# Patient Record
Sex: FEMALE | Race: AMERICAN INDIAN OR ALASKA NATIVE | URBAN - METROPOLITAN AREA
[De-identification: names, ages, dates, MRNs, and addresses within clinical notes are randomized per-mention and may not be internally consistent; named-entity substitution may affect disease eponyms.]

---

## 2017-11-25 ENCOUNTER — OFFICE VISIT (OUTPATIENT)
Dept: URGENT CARE | Facility: URGENT CARE | Age: 82
End: 2017-11-25
Payer: MEDICARE

## 2017-11-25 VITALS
SYSTOLIC BLOOD PRESSURE: 173 MMHG | OXYGEN SATURATION: 96 % | HEART RATE: 64 BPM | TEMPERATURE: 99.5 F | WEIGHT: 180 LBS | DIASTOLIC BLOOD PRESSURE: 88 MMHG

## 2017-11-25 DIAGNOSIS — I10 ESSENTIAL HYPERTENSION: ICD-10-CM

## 2017-11-25 DIAGNOSIS — R60.0 LEG EDEMA, RIGHT: Primary | ICD-10-CM

## 2017-11-25 PROCEDURE — 99203 OFFICE O/P NEW LOW 30 MIN: CPT | Performed by: PHYSICIAN ASSISTANT

## 2017-11-25 RX ORDER — MULTIVIT WITH MINERALS/LUTEIN
1 TABLET ORAL DAILY
COMMUNITY
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
## 2017-11-25 NOTE — NURSING NOTE
Chief Complaint   Patient presents with     Urgent Care     Pt in clinic c/o sudden onset of right foot and leg swelling.     Leg Swelling     Foot Problems       Initial /88  Pulse 64  Temp 99.5  F (37.5  C) (Tympanic)  Wt 180 lb (81.6 kg)  SpO2 96% There is no height or weight on file to calculate BMI.  Medication Reconciliation: complete   Jordyn Ugalde/ MA

## 2017-11-25 NOTE — MR AVS SNAPSHOT
"              After Visit Summary   2017    Daisy Mehta    MRN: [id number]           Patient Information     Date Of Birth          1935        Visit Information        Provider Department      2017 3:25 PM Armen Ugalde PA-C [facility]        Today's Diagnoses     Leg edema, right    -  1    Essential hypertension           Follow-ups after your visit        Who to contact     If you have questions or need follow up information about today's clinic visit or your schedule please contact [facility] directly at [phone].  Normal or non-critical lab and imaging results will be communicated to you by [employer], letter or phone within 4 business days after the clinic has received the results. If you do not hear from us within 7 days, please contact the clinic through [employer] or phone. If you have a critical or abnormal lab result, we will notify you by phone as soon as possible.  Submit refill requests through [employer] or call your pharmacy and they will forward the refill request to us. Please allow 3 business days for your refill to be completed.          Additional Information About Your Visit        MyChart Information     [employer] lets you send messages to your doctor, view your test results, renew your prescriptions, schedule appointments and more. To sign up, go to [email or web address] . Click on \"Log in\" on the left side of the screen, which will take you to the Welcome page. Then click on \"Sign up Now\" on the right side of the page.     You will be asked to enter the access code listed below, as well as some personal information. Please follow the directions to create your username and password.     Your access code is: [email or web address]  Expires: 2018  4:27 PM     Your access code will  in 90 days. If you need help or a new code, please call your [facility] clinic or [phone].        Care EveryWhere ID     This is your Care " EveryWhere ID. This could be used by other organizations to access your [facility] medical records  [id number]        Your Vitals Were     Pulse Temperature Pulse Oximetry             64 99.5  F (37.5  C) (Tympanic) 96%          Blood Pressure from Last 3 Encounters:   11/25/17 173/88    Weight from Last 3 Encounters:   11/25/17 180 lb (81.6 kg)              Today, you had the following     No orders found for display       Primary Care Provider    Physician No Ref-Primary       NO REF-PRIMARY PHYSICIAN        Equal Access to Services     MIREYATRISTEN ROBERTO : Hadii aad ku hadasho Soomaali, waaxda luqadaha, qaybta kaalmada adeegyada, waxay idiin hayaan adeeg mindyjerrymadhav lajenn . So [facility] [phone].    ATENCIÓN: Si habla español, tiene a sutherland disposición servicios gratuitos de asistencia lingüística. [facility] [phone].    We comply with applicable federal civil rights laws and [place] laws. We do not discriminate on the basis of race, color, national origin, age, disability, sex, sexual orientation, or gender identity.            Thank you!     Thank you for choosing [facility]  for your care. Our goal is always to provide you with excellent care. Hearing back from our patients is one way we can continue to improve our services. Please take a few minutes to complete the written survey that you may receive in the mail after your visit with us. Thank you!             Your Updated Medication List - Protect others around you: Learn how to safely use, store and throw away your medicines at www.disposemymeds.org.          This list is accurate as of: 11/25/17  4:27 PM.  Always use your most recent med list.                   Brand Name Dispense Instructions for use Diagnosis    ATENOLOL PO           CENTRUM SILVER per tablet      Take 1 tablet by mouth daily        IBUPROFEN PO           LORAZEPAM PO           RISPERIDONE PO           SERTRALINE HCL PO      Take by mouth daily        ZOLPIDEM TARTRATE  PO

## 2017-11-27 ENCOUNTER — HOSPITAL ENCOUNTER (OUTPATIENT)
Facility: CLINIC | Age: 82
Setting detail: OBSERVATION
Discharge: HOME | End: 2017-11-28
Attending: EMERGENCY MEDICINE | Admitting: FAMILY MEDICINE
Payer: MEDICARE

## 2017-11-27 ENCOUNTER — APPOINTMENT (OUTPATIENT)
Dept: ULTRASOUND IMAGING | Facility: CLINIC | Age: 82
End: 2017-11-27
Attending: EMERGENCY MEDICINE
Payer: MEDICARE

## 2017-11-27 DIAGNOSIS — I10 ESSENTIAL HYPERTENSION, MALIGNANT: ICD-10-CM

## 2017-11-27 DIAGNOSIS — I82.401 ACUTE DEEP VEIN THROMBOSIS (DVT) OF RIGHT LOWER EXTREMITY, UNSPECIFIED VEIN (H): ICD-10-CM

## 2017-11-27 DIAGNOSIS — I82.411 THROMBOSIS OF RIGHT FEMORAL VEIN (H): ICD-10-CM

## 2017-11-27 DIAGNOSIS — I10 BENIGN ESSENTIAL HYPERTENSION: Primary | ICD-10-CM

## 2017-11-27 LAB
ANION GAP SERPL CALCULATED.3IONS-SCNC: 8 MMOL/L (ref 3–14)
APTT PPP: 25 SEC (ref 22–37)
BASOPHILS # BLD AUTO: 0 10E9/L (ref 0–0.2)
BASOPHILS NFR BLD AUTO: 0.6 %
BUN SERPL-MCNC: 15 MG/DL (ref 7–30)
CALCIUM SERPL-MCNC: 9.1 MG/DL (ref 8.5–10.1)
CHLORIDE SERPL-SCNC: 104 MMOL/L (ref 94–109)
CO2 SERPL-SCNC: 26 MMOL/L (ref 20–32)
CREAT SERPL-MCNC: 0.85 MG/DL (ref 0.52–1.04)
D DIMER PPP FEU-MCNC: 9.5 UG/ML FEU (ref 0–0.5)
DIFFERENTIAL METHOD BLD: NORMAL
EOSINOPHIL # BLD AUTO: 0.2 10E9/L (ref 0–0.7)
EOSINOPHIL NFR BLD AUTO: 3 %
ERYTHROCYTE [DISTWIDTH] IN BLOOD BY AUTOMATED COUNT: 13.8 % (ref 10–15)
GFR SERPL CREATININE-BSD FRML MDRD: 64 ML/MIN/1.7M2
GLUCOSE SERPL-MCNC: 93 MG/DL (ref 70–99)
HCT VFR BLD AUTO: 42.5 % (ref 35–47)
HGB BLD-MCNC: 13.5 G/DL (ref 11.7–15.7)
IMM GRANULOCYTES # BLD: 0 10E9/L (ref 0–0.4)
IMM GRANULOCYTES NFR BLD: 0.3 %
INR PPP: 0.98 (ref 0.86–1.14)
LYMPHOCYTES # BLD AUTO: 2.8 10E9/L (ref 0.8–5.3)
LYMPHOCYTES NFR BLD AUTO: 39.6 %
MCH RBC QN AUTO: 30.1 PG (ref 26.5–33)
MCHC RBC AUTO-ENTMCNC: 31.8 G/DL (ref 31.5–36.5)
MCV RBC AUTO: 95 FL (ref 78–100)
MONOCYTES # BLD AUTO: 0.7 10E9/L (ref 0–1.3)
MONOCYTES NFR BLD AUTO: 10.4 %
NEUTROPHILS # BLD AUTO: 3.2 10E9/L (ref 1.6–8.3)
NEUTROPHILS NFR BLD AUTO: 46.1 %
NRBC # BLD AUTO: 0 10*3/UL
NRBC BLD AUTO-RTO: 0 /100
PLATELET # BLD AUTO: 262 10E9/L (ref 150–450)
POTASSIUM SERPL-SCNC: 4.7 MMOL/L (ref 3.4–5.3)
RBC # BLD AUTO: 4.49 10E12/L (ref 3.8–5.2)
SODIUM SERPL-SCNC: 138 MMOL/L (ref 133–144)
WBC # BLD AUTO: 7 10E9/L (ref 4–11)

## 2017-11-27 PROCEDURE — 85730 THROMBOPLASTIN TIME PARTIAL: CPT | Performed by: EMERGENCY MEDICINE

## 2017-11-27 PROCEDURE — 85379 FIBRIN DEGRADATION QUANT: CPT | Performed by: EMERGENCY MEDICINE

## 2017-11-27 PROCEDURE — 93971 EXTREMITY STUDY: CPT | Mod: RT

## 2017-11-27 PROCEDURE — 85025 COMPLETE CBC W/AUTO DIFF WBC: CPT | Performed by: EMERGENCY MEDICINE

## 2017-11-27 PROCEDURE — 84550 ASSAY OF BLOOD/URIC ACID: CPT | Performed by: EMERGENCY MEDICINE

## 2017-11-27 PROCEDURE — 99285 EMERGENCY DEPT VISIT HI MDM: CPT | Mod: Z6 | Performed by: EMERGENCY MEDICINE

## 2017-11-27 PROCEDURE — 99285 EMERGENCY DEPT VISIT HI MDM: CPT | Mod: 25 | Performed by: EMERGENCY MEDICINE

## 2017-11-27 PROCEDURE — 85610 PROTHROMBIN TIME: CPT | Performed by: EMERGENCY MEDICINE

## 2017-11-27 PROCEDURE — 80076 HEPATIC FUNCTION PANEL: CPT | Performed by: EMERGENCY MEDICINE

## 2017-11-27 PROCEDURE — 80048 BASIC METABOLIC PNL TOTAL CA: CPT | Performed by: EMERGENCY MEDICINE

## 2017-11-27 PROCEDURE — 84443 ASSAY THYROID STIM HORMONE: CPT | Performed by: EMERGENCY MEDICINE

## 2017-11-27 RX ORDER — ACETAMINOPHEN 500 MG
500 TABLET ORAL ONCE
Status: DISCONTINUED | OUTPATIENT
Start: 2017-11-27 | End: 2017-11-28 | Stop reason: HOSPADM

## 2017-11-27 ASSESSMENT — ENCOUNTER SYMPTOMS
SHORTNESS OF BREATH: 0
NUMBNESS: 0

## 2017-11-27 NOTE — IP AVS SNAPSHOT
[address] Observ[address] [address]    Phone:  [phone]    Fax:  [phone]                                       After Visit Summary   11/27/2017    Daisy Mehta    MRN: [id number]           After Visit Summary Signature Page     I have received my discharge instructions, and my questions have been answered. I have discussed any challenges I see with this plan with the nurse or doctor.    ..........................................................................................................................................  Patient/Patient Representative Signature      ..........................................................................................................................................  Patient Representative Print Name and Relationship to Patient    ..................................................               ................................................  Date                                            Time    ..........................................................................................................................................  Reviewed by Signature/Title    ...................................................              ..............................................  Date                                                            Time

## 2017-11-27 NOTE — IP AVS SNAPSHOT
MRN:[id number]                      After Visit Summary   11/27/2017    Daisy Mehta    MRN: [id number]           Thank you!     Thank you for choosing [facility] for your care. Our goal is always to provide you with excellent care. Hearing back from our patients is one way we can continue to improve our services. Please take a few minutes to complete the written survey that you may receive in the mail after you visit with us. Thank you!        Patient Information     Date Of Birth          1935        About your hospital stay     You were admitted on:  November 28, 2017 You last received care in the:  Unit [facility] Observation [facility]    You were discharged on:  November 28, 2017        Reason for your hospital stay       You were hospitalized due to new findings of a right leg blood clot. We have started a new medication called Xarelto (anticoagulant) to help treat this. You will need to take this medication for at least 3 months. You will need close follow up with primary care to recheck blood pressure and recheck symptoms.   Please DO NOT take Ibuprofen or Naproxen (NSAIDs) while taking Xarelto.   Please take Xarelto with food.                  Who to Call     For medical emergencies, please call 911.  For non-urgent questions about your medical care, please call your primary care provider or clinic, None          Attending Provider     Provider Specialty    Kalli Sheridan MD Emergency Medicine    [facility]       Primary Care Provider Fax #    Physician No Ref-Primary [phone]       When to contact your care team       Call your primary doctor if you have any of the following: temperature greater than 100.4F,  increased shortness of breath, increased swelling, vision changes or headache.                  After Care Instructions     Activity       Your activity upon discharge: activity as tolerated            Diet       Follow this diet upon discharge: Regular  "diet                  Follow-up Appointments     Adult [facility] Follow-up and recommended labs and tests       Follow up with primary care as scheduled on December 7th. If social work or care coordination is able to assist with resources relating to medication coverage, this would be very helpful.     Appointments on [facility] and/or [facility] (with [facility] or [facility] provider or service). Call [phone] if you haven't heard regarding these appointments within 7 days of discharge.                  Your next 10 appointments already scheduled     Dec 07, 2017  3:10 PM CST   (Arrive by 2:55 PM)   New Patient Visit with Carlito Romeo MD   [facility] ([facility])    [address]  4th Floor  [place] [address] [phone]              Pending Results     No orders found for last 3 day(s).            Statement of Approval     Ordered          11/28/17 5137  I have reviewed and agree with all the recommendations and orders detailed in this document.  EFFECTIVE NOW     Approved and electronically signed by:  Eleanor Vaz PA-C             Admission Information     Date & Time Provider Department Dept. Phone    11/27/2017 Brain Martini MD Unit 6D Observation [facility] [place] [phone]      Your Vitals Were     Blood Pressure Pulse Temperature Respirations Height Weight    161/82 (BP Location: Left arm) 71 97.5  F (36.4  C) (Oral) 17 1.651 m (5' 5\") 83.3 kg (183 lb 10.3 oz)    Pulse Oximetry BMI (Body Mass Index)                97% 30.56 kg/m2          [employer] Information     [employer] lets you send messages to your doctor, view your test results, renew your prescriptions, schedule appointments and more. To sign up, go to [email or web address] . Click on \"Log in\" on the left side of the screen, which will take you to the Welcome page. Then click on \"Sign up Now\" on the right side of the page.     You will be asked to enter the access code " listed below, as well as some personal information. Please follow the directions to create your username and password.     Your access code is: [email or web address]  Expires: 2018  4:27 PM     Your access code will  in 90 days. If you need help or a new code, please call your [facility] clinic or [phone].        Care EveryWhere ID     This is your Care EveryWhere ID. This could be used by other organizations to access your [facility] medical records  [id number]        Equal Access to Services     TRISTEN [facility] Hospital[facility] : Hadii joseph hicks hadtalyao Soyolie, waaxda luqadaha, qaybta kaalmada adejermaineyadonta, phyllis phillips . So [facility] [phone].    ATENCIÓN: Si habla español, tiene a sutherland disposición servicios gratuitos de asistencia lingüística. Llame al [phone].    We comply with applicable federal civil rights laws and [place] laws. We do not discriminate on the basis of race, color, national origin, age, disability, sex, sexual orientation, or gender identity.               Review of your medicines      START taking        Dose / Directions    Rivaroxaban 15 & 20 MG Tbpk   Used for:  Acute deep vein thrombosis (DVT) of right lower extremity, unspecified vein (H)        Dose:  15-20 mg   Take 15-20 mg by mouth daily   Quantity:  51 each   Refills:  2         CONTINUE these medicines which may have CHANGED, or have new prescriptions. If we are uncertain of the size of tablets/capsules you have at home, strength may be listed as something that might have changed.        Dose / Directions    atenolol 25 MG tablet   Commonly known as:  TENORMIN   This may have changed:  when to take this   Used for:  Benign essential hypertension        Dose:  50 mg   Take 2 tablets (50 mg) by mouth 2 times daily   Quantity:  60 tablet   Refills:  0         CONTINUE these medicines which have NOT CHANGED        Dose / Directions    CENTRUM SILVER per tablet        Dose:  1 tablet   Take 1 tablet by mouth daily   Refills:   0       LORAZEPAM PO        Dose:  1 mg   Take 1 mg by mouth 2 times daily as needed   Refills:  0       RISPERIDONE PO        Dose:  1 mg   Take 1 mg by mouth At Bedtime   Refills:  0       SERTRALINE HCL PO        Dose:  50 mg   Take 50 mg by mouth daily   Refills:  0       ZOLPIDEM TARTRATE PO        Dose:  6.25 mg   Take 6.25 mg by mouth At Bedtime   Refills:  0         STOP taking     IBUPROFEN PO                Where to get your medicines      These medications were sent to [facility] [place], [place] - [address]  [address] [address]     Phone:  [phone]     Rivaroxaban 15 & 20 MG Tbpk         Some of these will need a paper prescription and others can be bought over the counter. Ask your nurse if you have questions.     Bring a paper prescription for each of these medications     atenolol 25 MG tablet                Protect others around you: Learn how to safely use, store and throw away your medicines at www.disposemymeds.org.             Medication List: This is a list of all your medications and when to take them. Check marks below indicate your daily home schedule. Keep this list as a reference.      Medications           Morning Afternoon Evening Bedtime As Needed    atenolol 25 MG tablet   Commonly known as:  TENORMIN   Take 2 tablets (50 mg) by mouth 2 times daily   Last time this was given:  50 mg on 11/28/2017  3:11 AM                                CENTRUM SILVER per tablet   Take 1 tablet by mouth daily                                LORAZEPAM PO   Take 1 mg by mouth 2 times daily as needed                                RISPERIDONE PO   Take 1 mg by mouth At Bedtime   Last time this was given:  1 mg on 11/28/2017  3:11 AM                                Rivaroxaban 15 & 20 MG Tbpk   Take 15-20 mg by mouth daily                                SERTRALINE HCL PO   Take 50 mg by mouth daily   Last time this was given:  50 mg on 11/28/2017  8:00 AM                                 ZOLPIDEM TARTRATE PO   Take 6.25 mg by mouth At Bedtime

## 2017-11-28 ENCOUNTER — APPOINTMENT (OUTPATIENT)
Dept: CARDIOLOGY | Facility: CLINIC | Age: 82
End: 2017-11-28
Payer: MEDICARE

## 2017-11-28 VITALS
OXYGEN SATURATION: 97 % | HEART RATE: 71 BPM | RESPIRATION RATE: 17 BRPM | BODY MASS INDEX: 30.6 KG/M2 | SYSTOLIC BLOOD PRESSURE: 161 MMHG | WEIGHT: 183.64 LBS | HEIGHT: 65 IN | TEMPERATURE: 97.5 F | DIASTOLIC BLOOD PRESSURE: 82 MMHG

## 2017-11-28 PROBLEM — I82.409 DVT (DEEP VENOUS THROMBOSIS) (H): Status: ACTIVE | Noted: 2017-11-28

## 2017-11-28 LAB
ALBUMIN SERPL-MCNC: 3.1 G/DL (ref 3.4–5)
ALBUMIN SERPL-MCNC: 3.2 G/DL (ref 3.4–5)
ALP SERPL-CCNC: 82 U/L (ref 40–150)
ALP SERPL-CCNC: 85 U/L (ref 40–150)
ALT SERPL W P-5'-P-CCNC: 21 U/L (ref 0–50)
ALT SERPL W P-5'-P-CCNC: 22 U/L (ref 0–50)
ANION GAP SERPL CALCULATED.3IONS-SCNC: 9 MMOL/L (ref 3–14)
AST SERPL W P-5'-P-CCNC: 17 U/L (ref 0–45)
AST SERPL W P-5'-P-CCNC: 32 U/L (ref 0–45)
BASOPHILS # BLD AUTO: 0 10E9/L (ref 0–0.2)
BASOPHILS NFR BLD AUTO: 0.6 %
BILIRUB DIRECT SERPL-MCNC: <0.1 MG/DL (ref 0–0.2)
BILIRUB SERPL-MCNC: 0.5 MG/DL (ref 0.2–1.3)
BILIRUB SERPL-MCNC: 0.7 MG/DL (ref 0.2–1.3)
BUN SERPL-MCNC: 11 MG/DL (ref 7–30)
CALCIUM SERPL-MCNC: 8.9 MG/DL (ref 8.5–10.1)
CHLORIDE SERPL-SCNC: 105 MMOL/L (ref 94–109)
CO2 SERPL-SCNC: 27 MMOL/L (ref 20–32)
CREAT SERPL-MCNC: 0.79 MG/DL (ref 0.52–1.04)
DIFFERENTIAL METHOD BLD: NORMAL
EOSINOPHIL # BLD AUTO: 0.2 10E9/L (ref 0–0.7)
EOSINOPHIL NFR BLD AUTO: 3.1 %
ERYTHROCYTE [DISTWIDTH] IN BLOOD BY AUTOMATED COUNT: 13.8 % (ref 10–15)
GFR SERPL CREATININE-BSD FRML MDRD: 69 ML/MIN/1.7M2
GLUCOSE SERPL-MCNC: 84 MG/DL (ref 70–99)
HCT VFR BLD AUTO: 44.5 % (ref 35–47)
HGB BLD-MCNC: 14.3 G/DL (ref 11.7–15.7)
IMM GRANULOCYTES # BLD: 0 10E9/L (ref 0–0.4)
IMM GRANULOCYTES NFR BLD: 0.3 %
INTERPRETATION ECG - MUSE: NORMAL
LYMPHOCYTES # BLD AUTO: 2.9 10E9/L (ref 0.8–5.3)
LYMPHOCYTES NFR BLD AUTO: 42.3 %
MAGNESIUM SERPL-MCNC: 2.5 MG/DL (ref 1.6–2.3)
MCH RBC QN AUTO: 30.2 PG (ref 26.5–33)
MCHC RBC AUTO-ENTMCNC: 32.1 G/DL (ref 31.5–36.5)
MCV RBC AUTO: 94 FL (ref 78–100)
MONOCYTES # BLD AUTO: 0.4 10E9/L (ref 0–1.3)
MONOCYTES NFR BLD AUTO: 6.2 %
NEUTROPHILS # BLD AUTO: 3.2 10E9/L (ref 1.6–8.3)
NEUTROPHILS NFR BLD AUTO: 47.5 %
NRBC # BLD AUTO: 0 10*3/UL
NRBC BLD AUTO-RTO: 0 /100
PLATELET # BLD AUTO: 260 10E9/L (ref 150–450)
POTASSIUM SERPL-SCNC: 3.9 MMOL/L (ref 3.4–5.3)
PROT SERPL-MCNC: 7.9 G/DL (ref 6.8–8.8)
PROT SERPL-MCNC: 7.9 G/DL (ref 6.8–8.8)
RADIOLOGIST FLAGS: ABNORMAL
RBC # BLD AUTO: 4.73 10E12/L (ref 3.8–5.2)
SODIUM SERPL-SCNC: 141 MMOL/L (ref 133–144)
TSH SERPL DL<=0.005 MIU/L-ACNC: 2.67 MU/L (ref 0.4–4)
URATE SERPL-MCNC: 4.3 MG/DL (ref 2.6–6)
WBC # BLD AUTO: 6.8 10E9/L (ref 4–11)

## 2017-11-28 PROCEDURE — 36415 COLL VENOUS BLD VENIPUNCTURE: CPT | Performed by: PHYSICIAN ASSISTANT

## 2017-11-28 PROCEDURE — 93010 ELECTROCARDIOGRAM REPORT: CPT | Performed by: INTERNAL MEDICINE

## 2017-11-28 PROCEDURE — 93005 ELECTROCARDIOGRAM TRACING: CPT

## 2017-11-28 PROCEDURE — G0378 HOSPITAL OBSERVATION PER HR: HCPCS

## 2017-11-28 PROCEDURE — 93306 TTE W/DOPPLER COMPLETE: CPT | Mod: 26 | Performed by: INTERNAL MEDICINE

## 2017-11-28 PROCEDURE — A9270 NON-COVERED ITEM OR SERVICE: HCPCS | Mod: GY | Performed by: PHYSICIAN ASSISTANT

## 2017-11-28 PROCEDURE — 25500064 ZZH RX 255 OP 636: Performed by: FAMILY MEDICINE

## 2017-11-28 PROCEDURE — 85025 COMPLETE CBC W/AUTO DIFF WBC: CPT | Performed by: PHYSICIAN ASSISTANT

## 2017-11-28 PROCEDURE — 99219 ZZC INITIAL OBSERVATION CARE,LEVL II: CPT | Mod: Z6 | Performed by: PHYSICIAN ASSISTANT

## 2017-11-28 PROCEDURE — 83735 ASSAY OF MAGNESIUM: CPT | Performed by: PHYSICIAN ASSISTANT

## 2017-11-28 PROCEDURE — 80053 COMPREHEN METABOLIC PANEL: CPT | Performed by: PHYSICIAN ASSISTANT

## 2017-11-28 PROCEDURE — 25000132 ZZH RX MED GY IP 250 OP 250 PS 637: Mod: GY | Performed by: PHYSICIAN ASSISTANT

## 2017-11-28 PROCEDURE — 40000264 ECHO COMPLETE WITH OPTISON

## 2017-11-28 RX ORDER — RISPERIDONE 1 MG/1
1 TABLET ORAL AT BEDTIME
Status: DISCONTINUED | OUTPATIENT
Start: 2017-11-28 | End: 2017-11-28 | Stop reason: HOSPADM

## 2017-11-28 RX ORDER — ATENOLOL 25 MG/1
50 TABLET ORAL 2 TIMES DAILY
Qty: 60 TABLET | Refills: 0 | Status: SHIPPED | OUTPATIENT
Start: 2017-11-28 | End: 2018-02-13

## 2017-11-28 RX ORDER — ZOLPIDEM TARTRATE 6.25 MG/1
6.25 TABLET, FILM COATED, EXTENDED RELEASE ORAL AT BEDTIME
Status: DISCONTINUED | OUTPATIENT
Start: 2017-11-28 | End: 2017-11-28 | Stop reason: HOSPADM

## 2017-11-28 RX ORDER — ATENOLOL 50 MG/1
50 TABLET ORAL ONCE
Status: DISCONTINUED | OUTPATIENT
Start: 2017-11-28 | End: 2017-11-28

## 2017-11-28 RX ORDER — ATENOLOL 25 MG/1
50 TABLET ORAL DAILY
Status: DISCONTINUED | OUTPATIENT
Start: 2017-11-28 | End: 2017-11-28

## 2017-11-28 RX ORDER — LORAZEPAM 1 MG/1
1 TABLET ORAL 2 TIMES DAILY PRN
Status: DISCONTINUED | OUTPATIENT
Start: 2017-11-28 | End: 2017-11-28 | Stop reason: HOSPADM

## 2017-11-28 RX ORDER — NALOXONE HYDROCHLORIDE 0.4 MG/ML
.1-.4 INJECTION, SOLUTION INTRAMUSCULAR; INTRAVENOUS; SUBCUTANEOUS
Status: DISCONTINUED | OUTPATIENT
Start: 2017-11-28 | End: 2017-11-28 | Stop reason: HOSPADM

## 2017-11-28 RX ORDER — LIDOCAINE 40 MG/G
CREAM TOPICAL
Status: DISCONTINUED | OUTPATIENT
Start: 2017-11-28 | End: 2017-11-28 | Stop reason: HOSPADM

## 2017-11-28 RX ORDER — ATENOLOL 25 MG/1
50 TABLET ORAL 2 TIMES DAILY
Status: DISCONTINUED | OUTPATIENT
Start: 2017-11-28 | End: 2017-11-28 | Stop reason: HOSPADM

## 2017-11-28 RX ADMIN — RIVAROXABAN 15 MG: 15 TABLET, FILM COATED ORAL at 03:11

## 2017-11-28 RX ADMIN — RIVAROXABAN 15 MG: 15 TABLET, FILM COATED ORAL at 12:48

## 2017-11-28 RX ADMIN — RISPERIDONE 1 MG: 1 TABLET ORAL at 03:11

## 2017-11-28 RX ADMIN — HUMAN ALBUMIN MICROSPHERES AND PERFLUTREN 6 ML: 10; .22 INJECTION, SOLUTION INTRAVENOUS at 09:30

## 2017-11-28 RX ADMIN — ATENOLOL 50 MG: 25 TABLET ORAL at 03:11

## 2017-11-28 RX ADMIN — ZOLPIDEM TARTRATE 6.25 MG: 6.25 TABLET, FILM COATED, EXTENDED RELEASE ORAL at 03:11

## 2017-11-28 RX ADMIN — SERTRALINE HYDROCHLORIDE 50 MG: 50 TABLET ORAL at 08:00

## 2017-11-28 ASSESSMENT — ENCOUNTER SYMPTOMS
VOMITING: 0
WEAKNESS: 0
BRUISES/BLEEDS EASILY: 0
PALPITATIONS: 0
BACK PAIN: 1
NECK PAIN: 0
FATIGUE: 0
COUGH: 0
LIGHT-HEADEDNESS: 0
ABDOMINAL PAIN: 0
COLOR CHANGE: 0
FEVER: 0
NAUSEA: 0
BLOOD IN STOOL: 0
NECK STIFFNESS: 0
DIZZINESS: 0

## 2017-11-28 NOTE — PROGRESS NOTES
"  Care Coordinator Progress Note     Admission Date/Time:  11/27/2017  Attending MD:  Brain Martini MD     Data  Chart reviewed, discussed with interdisciplinary team.   Patient was admitted for: Acute deep vein thrombosis (DVT) of right lower extremity, unspecified vein (H).    Concerns with insurance coverage for discharge needs: None.  Current Living Situation: Patient lives with adult child.  Support System: Supportive and Involved  Services Involved: none currently.   Transportation: Family or Friend will provide  Barriers to Discharge: chronically ill and cognitively impaired    Coordination of Care and Referrals: Provided patient/family with options for discussion of current living situation.       Assessment   At pt bedside to discuss RNCC role, establishing primary care, homecare and pt current living situation. Pt states she is currently staying with her son (pt from [place]). Writer asked pt permission to schedule PCP appointment to establish primary care; pt states, \"No. I don't think I can afford it. I have to discuss this with my son first.\" Pt also does *not* want recommended home care services as she states she wants to discuss with son first. GILDA Forrester attempted to call son and left VM. Pt states he will most likely not call back as he is at work.      Plan  Anticipated Discharge Date: pending  Anticipated Discharge Plan:  pending    Concetta Hope RN          "
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
## 2017-11-28 NOTE — ED PROVIDER NOTES
"  History     Chief Complaint   Patient presents with     Leg Pain     HPI  Daisy Mehta is a 82 year old female with a history of arthritis and hypertension who presents to the ED with right leg pain swelling.     Patient notes 2-3 day history of right lower leg and foot swelling up to the right knee. Patient notes \"funny sensation\" all over right lower leg and states it is \"stiff\" with the swelling which is uncomfortable for her, no severe pain. Denies previous history of gout, pseudo-gout, joint infections, PE/DVT. No jordy joint pain. No traumas or falls. No prolonged travel. No estrogen products. Denies chest pain, shortness of breath. No syncope or near syncope. No LH or dizziness. Denies numbness and tingling of left lower extremity. No weakness. No other new symptoms or complaints at this time.    Patient was seen at urgent care this past Saturday for swelling and patient notes clinic was unable to perform diagnostic testing due to the fact that they did not have an ultrasound. Patient states she is blood pressure medications of: sertraline, atenolol, risperidone, Zolpidem and takes Ibprofen, lorazepam prn.  I have reviewed the Medications, Allergies, Past Medical and Surgical History, and Social History in the Epic system.    History reviewed. No pertinent past medical history.    History reviewed. No pertinent surgical history.    No family history on file.    Social History   Substance Use Topics     Smoking status: Never Smoker     Smokeless tobacco: Current User     Alcohol use No       No current facility-administered medications for this encounter.      Current Outpatient Prescriptions   Medication     Multiple Vitamins-Minerals (CENTRUM SILVER) per tablet     LORAZEPAM PO     RISPERIDONE PO     ATENOLOL PO     SERTRALINE HCL PO     IBUPROFEN PO     ZOLPIDEM TARTRATE PO        No Known Allergies    Review of Systems   Constitutional: Negative for fatigue and fever.   Respiratory: Negative for cough " and shortness of breath.    Cardiovascular: Positive for leg swelling (right ). Negative for chest pain and palpitations.   Gastrointestinal: Negative for abdominal pain, blood in stool, nausea and vomiting.   Musculoskeletal: Positive for back pain (chronic, unchanged). Negative for neck pain and neck stiffness.        RLE swelling, mild discomfort Knee down through foot. Remainder unaffected   Skin: Negative for color change and rash.   Allergic/Immunologic: Negative for immunocompromised state.   Neurological: Negative for dizziness, syncope, weakness, light-headedness and numbness.   Hematological: Does not bruise/bleed easily.        No known clotting disorders   All other systems reviewed and are negative.      Physical Exam   BP: 198/87  Pulse: 71  Heart Rate: 73  Temp: 98.4  F (36.9  C)  Resp: 18  SpO2: 98 %      Physical Exam   CONSTITUTIONAL: Well-developed and well-nourished. Awake and alert. Non-toxic appearance. No acute distress.   HENT:   - Head: Normocephalic and atraumatic.   - Ears: Hearing and external ear grossly normal.   - Nose: Nose normal. No rhinorrhea. No epistaxis.   - Mouth/Throat: Oropharynx is clear and MMM  EYES: Conjunctivae and lids are normal. No scleral icterus.   NECK: Normal range of motion and phonation normal. Neck supple.  No tracheal deviation, no stridor. No edema or erythema noted.  CARDIOVASCULAR: Normal rate, regular rhythm and no appreciable abnormal heart sounds.  PULMONARY/CHEST: Effort normal. No accessory muscle usage or stridor. No respiratory distress.  No appreciable abnormal breath sounds.  ABDOMEN: Soft, non-distended. No tenderness. No rigidity, rebound or guarding.   MUSCULOSKELETAL: Extremities warm and seemingly well perfused. RLE edema from about the knee down. No erythema or skin changes. No appreciable join effusions, no abnormal warmth or erythema. Normal joint ROM. Strength intact.    NEUROLOGIC: Awake, alert. Not disoriented. Normal tone. No seizure  activity. Coordination normal. GCS 15  SKIN: Skin is warm and dry. No rash noted. No diaphoresis. No pallor.   PSYCHIATRIC: Normal mood and affect. Speech and behavior normal. Thought processes linear. Cognition and memory are normal.     ED Course     ED Course     Procedures             Labs Ordered and Resulted from Time of ED Arrival Up to the Time of Departure from the ED   D DIMER QUANTITATIVE   CBC WITH PLATELETS DIFFERENTIAL   BASIC METABOLIC PANEL            Assessments & Plan (with Medical Decision Making)   IMPRESSION: 82 year old female with notable for HTN, some early dementia, who presents with a 3 day history of right lower extremity swelling and discomfort. Clinically the patient looks well, she has hypertension with quite high systolics, not as high diastolics, but is completely asymptomatic in that regard. No chest pain, headaches, no renal changes, or no shortness of breath, etc.  With regards to her right lower extremity, she has some swelling essentially from about the knee down. No evidence for cellulitis. No particular warmth over any of the joints and still has full range of motion of the joints without significant discomfort.  No apparent joint effusions or erythema there for a septic or inflammatory arthritis.  It does appear consistent with possible DVT though no significant risk factors for such are known at this time, no recent traumas, etc. No known hx of heart, lung or renal disease for other causes for peripheral edema, and even then think would be unlikely to present unilaterally.    PLAN: Baseline laboratory studies, DVT ultrasound of the right leg.    RESULTS:  - Labs: D-dimer 9.5, otherwise CBC, BMP, hepatic panel, uric acid, thyroid, coags are grossly WNL  --- See ED Course section above for particular pertinent findings and comments  - Imaging: Images and written preliminary reports reviewed by myself and revealed:  --- Right leg ultrasound: Called by Radiology, reportedly has a  nonocclusive popliteal and femoral vein thrombus    RE-EVALUATION:  - No acute changes in ED. Was hypertensive, though asymptomatic.   - Ordered her AM atenolol early given her BP    DISCUSSIONS:  - w/ ED Obs/SHEKHAR: They will admit for further evaluation and management  - w/ Patient: I have reviewed the available findings, plan with the patient and her family/loved ones. They expressed understanding and agreement with this plan. All questions answered to the best of our ability at this time.     DISPOSITION/PLANNING:  - FINAL IMPRESSION: Right lower extremity DVT, HTN  - DISPOSITION: Admitted to ED observation for further evaluation and management, initiation of anticoagulation and discussion with family about best plan/modality for such.  --- Asymptomatic but higher BP, PCP may need to adjust/augment chronic BP management regimen. Received home dose of atenolol here in ED.    This part of the medical record was transcribed by Nicholas Perdomo Medical Scribe, from a dictation done by Kalli Sheridan MD.    _____________________________________________________________________________    - I have reviewed the available nursing notes.            New Prescriptions    No medications on file       Final diagnoses:   None   IYuli, am serving as a trained medical scribe to document services personally performed by Kalli Sheridan MD, based on the provider's statements to me.    IKalli MD, was physically present and have reviewed and verified the accuracy of this note documented by Yuli Cherry.     11/27/2017   [facility] EMERGENCY DEPARTMENT     Kalli Sheridan MD  11/28/17 [id number]

## 2017-11-28 NOTE — PLAN OF CARE
Problem: Patient Care Overview  Goal: Discharge Needs Assessment  - Anticoagulants initiated. Yes  - Patient or patient's representative demonstrates ability to administer Low Molecular Weight Heparin (LMWH) or home health is arranged. No  - Patient education re: Deep Vein Thrombosis, LMWH, Coumadin. No  - Diagnostic tests and consults completed and resulted. No. Pending  - Vital signs normal or at patient baseline. No. Elevated b/p  - Adequate pain control on oral analgesia if ordered. Yes. Patient denies pain  - Return to baseline functional status. Yes  - Safe disposition plan has been identified. No. Pending  Patient alert. Forgetful. Presented with leg pain and swelling. Has DVT per results. Swelling noted left LE. Denies pain. Echo cardiogram to be done in am. B/P elevated.

## 2017-11-28 NOTE — PLAN OF CARE
Problem: Patient Care Overview  Goal: Discharge Needs Assessment  Anticoagulants initiated. Yes  - Patient or patient's representative demonstrates ability to administer Low Molecular Weight Heparin (LMWH) or home health is arranged. No  - Patient education re: Deep Vein Thrombosis, LMWH, Coumadin. No  - Diagnostic tests and consults completed and resulted. No. Pending  - Vital signs normal or at patient baseline. No. Elevated b/p  - Adequate pain control on oral analgesia if ordered. Yes. Patient denies pain  - Return to baseline functional status. Yes  - Safe disposition plan has been identified. No. Pending  Patient alert. Forgetful. Presented with leg pain and swelling. Has DVT per results. Swelling noted left LE. Denies pain. Echo cardiogram to be done in am. B/P elevated.

## 2017-11-28 NOTE — PLAN OF CARE
Problem: Patient Care Overview  Goal: Plan of Care/Patient Progress Review  Outcome: No Change  Outpatient/Observation goals to be met before discharge home:    OBSERVATION UNIT DISCHARGE PLANNING GOALS:    Anticoagulants initiated. Yes.  - Patient or patient's representative demonstrates ability to administer Low Molecular Weight Heparin (LMWH) or home health is arranged. No.  - Patient education re: Deep Vein Thrombosis, LMWH, Coumadin. No, pt is confuse, oriented to self only. Pt's son is involve with pt care. Will provide son with information, when he comes.  - Diagnostic tests and consults completed and resulted. Yes.  - Vital signs normal or at patient baseline. No, pt has elevated bp prior to admission per provider.  - Adequate pain control on oral analgesia if ordered. Yes, she robert pain.  - Return to baseline functional status. Yes  - Safe disposition plan has been identified. Yes.  Patient alert. Forgetful. Presented with leg pain and swelling. Has DVT per results. Swelling noted left LE. Denies pain. Echo cardiogram to be done in am. Yes  The provider is coordinating discharge plan with pt's son.

## 2017-11-28 NOTE — ED NOTES
Pt presents ambulatory to triage from home. Pt states for past 2-3 days has had pain at right leg and foot along with swelling. Pt was seen UC this past Saturday for swelling. Opted not to have ultrasound at  that time.

## 2017-11-28 NOTE — H&P
[facility] ED Observation Admission Note    Chief Complaint   Patient presents with     Leg Pain       Assessment: Daisy Mehta is a 82 year old obese female with a history of HTN, arthritis, dementia who presented to the ED with right leg pain and swelling. She reports a 3 day history of RLE swelling and pain.      Plan:  1. New Onset DVT: 3 day history of RLE swelling and pain. 2 mechanical falls in 3 months but no trauma/injury. Travelled from [place] 3 months ago to [place]. Denies h/o PE/DVT, gastric bypass, GI issues, cancer, miscarriages, tobacco use, recent hormonal use, miscarriages, family history of bleeding disorders. Denies any chest pain, SOB, palpitations or tachycardia, headache, lightheadedness. She is pretty sedentary. In ED, HR 70's, 's-230's/80's-110's, RR 16-18, SaO2 % on RA, Temp 98.4. Normal CMP except an albumin of 3.1. Normal CBC. INR 0.98. D-dimer 9.5. RLE doppler US reports a nonocclusive DVT in the right femoral and popliteal.   - Initiate Xarelto 15mg po BID x 21 days and then 20mg po daily thereafter  - Baseline EKG in AM  - Repeat CBC, BMP in AM    2. HTN: HR 70's, 's-230's/80's-110's. Per son has been running as high as 180's prior to this. Only on Atenolol which was started in the [place]. She has not followed with anyone here regarding this.   - Continue Atenolol 50mg po daily. Give dose now  - Consider adding on a second agent    Chronic Problems:  ## Dementia/FABRICIO: - Continue PTA Risperidone, Sertraline, Trazodone    HPI:    Daisy Mehta is a 82 year old obese female with a history of HTN, arthritis, dementia who presented to the ED with right leg pain and swelling. She reports a 3 day history of RLE swelling and pain. Onset was gradual and has progressed to swelling up to the knee. The patient denies any trauma however her son reports that she has had 2 falls in the last 3 months due to being tangled up in her sheets at night and confusion and having to go to  the bathroom. There was no injury. Her son brought her here from the [place] 3 months ago and since then there has been no travel. She denies any history of PE or DVT. No history gastric bypass, GI issues, cancer, miscarriages, tobacco use, recent hormonal use, miscarriages, family history of bleeding disorders. Her son reports that she is pretty sedentary otherwise. Denies any chest pain, SOB, palpitations or tachycardia, headache, lightheadedness. Off note she was seen at a [facility] 2 days ago and was told to come to the [facility] ED as they didn't have an ultrasound however they didn't. Her son states that her BP has been elevated for sometime and she is compliant with her medicines specifically Atenolol.    In the ED, HR 70's, 's-230's/80's-110's, RR 16-18, SaO2 % on RA, Temp 98.4. Labs show normal CMP except an albumin of 3.1. Normal CBC. INR 0.98. D-dimer 9.5. RLE doppler US reports a nonocclusive DVT in the right femoral and popliteal. She is being admitted for management.     On admission to the observation unit the patient was stable.    History:    Past Medical History:   Diagnosis Date     Dementia      Hypertension        History reviewed. No pertinent surgical history.    No family history on file.    Social History     Social History     Marital status:      Spouse name: N/A     Number of children: N/A     Years of education: N/A     Occupational History     Not on file.     Social History Main Topics     Smoking status: Never Smoker     Smokeless tobacco: Current User     Alcohol use No     Drug use: No     Sexual activity: Not on file     Other Topics Concern     Not on file     Social History Narrative         No current facility-administered medications on file prior to encounter.   Current Outpatient Prescriptions on File Prior to Encounter:  Multiple Vitamins-Minerals (CENTRUM SILVER) per tablet Take 1 tablet by mouth daily   LORAZEPAM PO Take 1 mg by mouth 2 times daily  as needed    RISPERIDONE PO Take 1 mg by mouth At Bedtime    ATENOLOL PO Take 50 mg by mouth daily    SERTRALINE HCL PO Take 50 mg by mouth daily    IBUPROFEN PO    ZOLPIDEM TARTRATE PO Take 6.25 mg by mouth At Bedtime        Data:    Results for orders placed or performed during the hospital encounter of 11/27/17   US Lower Extremity Venous Duplex Right   Result Value Ref Range    Radiologist flags Newly identified deep venous thrombosis (Urgent)     Narrative    EXAMINATION: US LOWER EXTREMITY VENOUS DUPLEX RIGHT, 11/27/2017 11:24  PM     COMPARISON: None.    HISTORY: Right lower summary swelling    TECHNIQUE:  Gray-scale evaluation with compression, spectral flow, and  color Doppler assessment of the deep venous system of the right leg  from groin to knee, and then at the ankle.    FINDINGS:  In the right lower extremity, there is partial compressibility of the  femoral vein through the length of the thigh and the popliteal vein.  The common femoral vein is fully compressible, as is the posterior  tibial vein at the ankle. There is small amount of subcutaneous edema  at the ankle.     The left common femoral vein is patent and compressible.        Impression    IMPRESSION:  Nonocclusive deep venous thrombosis in the right femoral and popliteal  veins.      [Urgent Result: Newly identified deep venous thrombosis]    Finding was identified on 11/27/2017 11:25 PM.     Dr. Sheridan was contacted by Dr. Ballesteros at 11/27/2017 11:28 PM and  verbalized understanding of the urgent finding.      D dimer quantitative   Result Value Ref Range    D Dimer 9.5 (H) 0.0 - 0.50 ug/ml FEU   CBC with platelets differential   Result Value Ref Range    WBC 7.0 4.0 - 11.0 10e9/L    RBC Count 4.49 3.8 - 5.2 10e12/L    Hemoglobin 13.5 11.7 - 15.7 g/dL    Hematocrit 42.5 35.0 - 47.0 %    MCV 95 78 - 100 fl    MCH 30.1 26.5 - 33.0 pg    MCHC 31.8 31.5 - 36.5 g/dL    RDW 13.8 10.0 - 15.0 %    Platelet Count 262 150 - 450 10e9/L    Diff Method  Automated Method     % Neutrophils 46.1 %    % Lymphocytes 39.6 %    % Monocytes 10.4 %    % Eosinophils 3.0 %    % Basophils 0.6 %    % Immature Granulocytes 0.3 %    Nucleated RBCs 0 0 /100    Absolute Neutrophil 3.2 1.6 - 8.3 10e9/L    Absolute Lymphocytes 2.8 0.8 - 5.3 10e9/L    Absolute Monocytes 0.7 0.0 - 1.3 10e9/L    Absolute Eosinophils 0.2 0.0 - 0.7 10e9/L    Absolute Basophils 0.0 0.0 - 0.2 10e9/L    Abs Immature Granulocytes 0.0 0 - 0.4 10e9/L    Absolute Nucleated RBC 0.0    Basic metabolic panel   Result Value Ref Range    Sodium 138 133 - 144 mmol/L    Potassium 4.7 3.4 - 5.3 mmol/L    Chloride 104 94 - 109 mmol/L    Carbon Dioxide 26 20 - 32 mmol/L    Anion Gap 8 3 - 14 mmol/L    Glucose 93 70 - 99 mg/dL    Urea Nitrogen 15 7 - 30 mg/dL    Creatinine 0.85 0.52 - 1.04 mg/dL    GFR Estimate 64 >60 mL/min/1.7m2    GFR Estimate If Black 77 >60 mL/min/1.7m2    Calcium 9.1 8.5 - 10.1 mg/dL   INR   Result Value Ref Range    INR 0.98 0.86 - 1.14   Partial thromboplastin time   Result Value Ref Range    PTT 25 22 - 37 sec   Hepatic panel   Result Value Ref Range    Bilirubin Direct <0.1 0.0 - 0.2 mg/dL    Bilirubin Total 0.5 0.2 - 1.3 mg/dL    Albumin 3.1 (L) 3.4 - 5.0 g/dL    Protein Total 7.9 6.8 - 8.8 g/dL    Alkaline Phosphatase 82 40 - 150 U/L    ALT 22 0 - 50 U/L    AST 32 0 - 45 U/L   TSH with free T4 reflex   Result Value Ref Range    TSH 2.67 0.40 - 4.00 mU/L   Uric acid   Result Value Ref Range    Uric Acid 4.3 2.6 - 6.0 mg/dL          ROS:    Review Of Systems  Skin: negative  Eyes: negative  Ears/Nose/Throat: negative  Respiratory: No shortness of breath, dyspnea on exertion, cough, or hemoptysis  Cardiovascular: negative  Gastrointestinal: negative for, poor appetite, nausea, vomiting, heartburn, hematemesis, abdominal pain, melena, hematochezia, constipation and stomach or duodenal ulcer  Genitourinary: negative  Musculoskeletal: back pain, arthritis and joint pain  Neurologic:  dementia  Psychiatric: negative  Hematologic/Lymphatic/Immunologic: negative  Endocrine: negative  PCP: none    10 point ROS negative other than the symptoms noted above.      Exam:  Vitals:  B/P: 200/88, T: 98.4, P: 71, R: 16    Constitutional: alert, no distress, cooperative and over weight  Head: Normocephalic. No masses, lesions, tenderness or abnormalities  Neck: Neck supple. No adenopathy. Thyroid symmetric, normal size,, Carotids without bruits.  ENT: ENT exam normal, no neck nodes or sinus tenderness  Cardiovascular: negative, PMI normal. No lifts, heaves, or thrills. RRR. No murmurs, clicks gallops or rub  Respiratory: negative, Percussion normal. Good diaphragmatic excursion. Lungs clear  Gastrointestinal: Abdomen soft, non-tender. BS normal. No masses, organomegaly  : Deferred  Musculoskeletal: extremities normal- no gross deformities noted, gait normal and normal muscle tone  Ext: RLE edema with 2+ pitting  Skin: no suspicious lesions or rashes  Neurologic: Gait normal. Reflexes normal and symmetric. Sensation grossly WNL.  Psychiatric: mentation appears normal and affect normal/bright  Hematologic/Lymphatic/Immunologic: normal ant/post cervical, axillary, supraclavicular and inguinal nodes    Consults: none  FEN: regular diet  DVT prophylaxis: xarelto  GI prophylaxis: none  BM prophylaxis: none  Code Status: full code  Disposition: home if labs and vitals are all stable, tolerating xarelto    Signed:  Genaro Li PA-C   November 28, 2017 at 2:17 AM

## 2017-11-28 NOTE — PLAN OF CARE
Problem: Patient Care Overview  Goal: Plan of Care/Patient Progress Review  Outcome: No Change  Outpatient/Observation goals to be met before discharge home:  OBSERVATION UNIT DISCHARGE PLANNING GOALS:    Anticoagulants initiated. Yes.  - Patient or patient's representative demonstrates ability to administer Low Molecular Weight Heparin (LMWH) or home health is arranged. No.  - Patient education re: Deep Vein Thrombosis, LMWH, Coumadin. No, pt is confuse.  - Diagnostic tests and consults completed and resulted. No. Pending.  - Vital signs normal or at patient baseline. No, pt has elevated bp prior to admission per provider.  - Adequate pain control on oral analgesia if ordered. Yes.   - Return to baseline functional status. Yes  - Safe disposition plan has been identified. Yes.  Patient alert. Forgetful. Presented with leg pain and swelling. Has DVT per results. Swelling noted left LE. Denies pain. Echo cardiogram to be done in am. Yes.

## 2017-11-28 NOTE — PROGRESS NOTES
Social Work Services Progress Note    Hospital Day: 1 (observation)  Collaborated with:  Pt, son, Ricky ([phone]), SHEKHAR    Data:  SW consulted to check into medication coverage as pt is going to be discharging on Xarelto for DVT. Checked w/ our D[facility]C Pharmacy and pt did not have Medicare D plan and pt would be eligible for a Xarelto copay card for one month free. Discussed w/ pt's son and he reported that pt is getting her medications filled at [facility] ([phone]); provider contacted [facility] and pt does not have Medicare D and was getting a discount on medications. Informed son of this and that pt would be eligible for one month free and there after it would be ~$400/mo, son reported that he would make sure that pt would continue to receive the medication after initial free month. Son reports that pt has a PCP appt on December 7th at [facility]; writer has confirmed this through Epic scheduling system.     Intervention:  Resource     Assessment:  Pt was plesantly confused and did not know about her medication coverage. Pt's son updated and is aware of financial costs related to new medication. Although he expressed concerns about the cost of the medication he did inform writer that he understood the importance of this medication and he would make sure pt would continue to receive it after her initial free month.     Plan:    Anticipated Disposition:  Home with services-[facility], which will be arranged by [facility], after she has discussed with son    Barriers to d/c plan:  Insurance/financial    Follow Up:  No further SW needs identified.

## 2017-11-29 NOTE — PROGRESS NOTES
PIV removed.  DC instructions inlcuding f/u care, new meds, activity recs reviewed with pt and pt sontimur.  All questions answered pt to DC ~ 1930.

## 2017-12-28 ENCOUNTER — OFFICE VISIT (OUTPATIENT)
Dept: FAMILY MEDICINE | Facility: CLINIC | Age: 82
End: 2017-12-28
Payer: MEDICARE

## 2017-12-28 VITALS
TEMPERATURE: 98.6 F | DIASTOLIC BLOOD PRESSURE: 88 MMHG | HEART RATE: 64 BPM | SYSTOLIC BLOOD PRESSURE: 151 MMHG | WEIGHT: 180 LBS | OXYGEN SATURATION: 98 % | RESPIRATION RATE: 14 BRPM | BODY MASS INDEX: 29.95 KG/M2

## 2017-12-28 DIAGNOSIS — R31.9 HEMATURIA, UNSPECIFIED TYPE: ICD-10-CM

## 2017-12-28 DIAGNOSIS — F03.90 DEMENTIA WITHOUT BEHAVIORAL DISTURBANCE, UNSPECIFIED DEMENTIA TYPE: ICD-10-CM

## 2017-12-28 DIAGNOSIS — R01.1 HEART MURMUR: ICD-10-CM

## 2017-12-28 DIAGNOSIS — I10 UNCONTROLLED HYPERTENSION: Primary | ICD-10-CM

## 2017-12-28 DIAGNOSIS — I82.431 ACUTE DEEP VEIN THROMBOSIS (DVT) OF POPLITEAL VEIN OF RIGHT LOWER EXTREMITY (H): ICD-10-CM

## 2017-12-28 LAB
ALBUMIN UR-MCNC: NEGATIVE MG/DL
APPEARANCE UR: CLEAR
BILIRUB UR QL STRIP: NEGATIVE
COLOR UR AUTO: YELLOW
GLUCOSE UR STRIP-MCNC: NEGATIVE MG/DL
HGB UR QL STRIP: ABNORMAL
KETONES UR STRIP-MCNC: NEGATIVE MG/DL
LEUKOCYTE ESTERASE UR QL STRIP: ABNORMAL
NITRATE UR QL: NEGATIVE
PH UR STRIP: 7 PH (ref 5–7)
RBC #/AREA URNS AUTO: ABNORMAL /HPF
SOURCE: ABNORMAL
SP GR UR STRIP: 1.02 (ref 1–1.03)
UROBILINOGEN UR STRIP-ACNC: 0.2 EU/DL (ref 0.2–1)
WBC #/AREA URNS AUTO: ABNORMAL /HPF

## 2017-12-28 PROCEDURE — 86780 TREPONEMA PALLIDUM: CPT | Performed by: FAMILY MEDICINE

## 2017-12-28 PROCEDURE — 81001 URINALYSIS AUTO W/SCOPE: CPT | Performed by: FAMILY MEDICINE

## 2017-12-28 PROCEDURE — 84425 ASSAY OF VITAMIN B-1: CPT | Mod: 90 | Performed by: FAMILY MEDICINE

## 2017-12-28 PROCEDURE — 84443 ASSAY THYROID STIM HORMONE: CPT | Performed by: FAMILY MEDICINE

## 2017-12-28 PROCEDURE — 83921 ORGANIC ACID SINGLE QUANT: CPT | Mod: 90 | Performed by: FAMILY MEDICINE

## 2017-12-28 PROCEDURE — 36415 COLL VENOUS BLD VENIPUNCTURE: CPT | Performed by: FAMILY MEDICINE

## 2017-12-28 PROCEDURE — 99000 SPECIMEN HANDLING OFFICE-LAB: CPT | Performed by: FAMILY MEDICINE

## 2017-12-28 PROCEDURE — 99214 OFFICE O/P EST MOD 30 MIN: CPT | Performed by: FAMILY MEDICINE

## 2017-12-28 PROCEDURE — 82607 VITAMIN B-12: CPT | Performed by: FAMILY MEDICINE

## 2017-12-28 RX ORDER — AMLODIPINE BESYLATE 2.5 MG/1
2.5 TABLET ORAL DAILY
Qty: 30 TABLET | Refills: 1 | Status: SHIPPED | OUTPATIENT
Start: 2017-12-28

## 2017-12-28 RX ORDER — ATENOLOL 50 MG/1
50 TABLET ORAL
Qty: 180 TABLET | Refills: 3 | Status: SHIPPED | OUTPATIENT
Start: 2017-12-28

## 2017-12-28 NOTE — MR AVS SNAPSHOT
After Visit Summary   12/28/2017    Daisy Mehta    MRN: [id number]           Patient Information     Date Of Birth          1935        Visit Information        Provider Department      12/28/2017 3:40 PM Yakov Marroquin MD [facility]        Today's Diagnoses     Uncontrolled hypertension    -  1    Acute deep vein thrombosis (DVT) of popliteal vein of right lower extremity (H)        Dementia without behavioral disturbance, unspecified dementia type        Heart murmur          Care Instructions    To schedule your heart echo and brain MRI and Mammogram, call [phone] for [facility] scheduling.              Follow-ups after your visit        Additional Services     MEMORY CLINIC REFERRAL       Your provider has referred you to: [facility] - [phone] , [facility] - [phone] and [facility] [phone]    Please answer the following questions to ensure a successful referral:    Has the patient been diagnosed with any positive neuro-cognitive impairment? no    Primary objective or goal of consultation (please provide some detail):  Behavioral and psychological symptoms , Medication questions  and Patient/family education     What is the name of the person that should be contacted to schedule the appt?  SonElida Mehta    What is the relationship to the patient? Brother     What is the best number to reach them at?  [phone]     The patient/family will be contacted within 1-2 business days to schedule your appointment. If you haven't heard from us within that timeframe, please call the number above.     Please be aware that coverage of these services is subject to the terms and limitations of your health insurance plan.  Call member services at your health plan with any benefit or coverage questions.      Please bring the following to your appointment:  >>   Any x-rays, CTs or  MRIs which have been performed.  Contact the facility where they were done to arrange for  prior to your scheduled appointment.  Any new CT, MRI or other procedures ordered by your specialist must be performed at a [place] facility or coordinated by your clinic's referral office.    >>   List of current medications   >>   This referral request   >>   Any documents/labs given to you for this referral            ONC/HEME ADULT REFERRAL       Your provider has referred you to: [facility] [phone]   [email or web address]    Please be aware that coverage of these services is subject to the terms and limitations of your health insurance plan.  Call member services at your health plan with any benefit or coverage questions.      Please bring the following with you to your appointment:    (1) Any X-Rays, CTs or MRIs which have been performed.  Contact the facility where they were done to arrange for  prior to your scheduled appointment.   (2) List of current medications  (3) This referral request   (4) Any documents/labs given to you for this referral                  Follow-up notes from your care team     Return in about 3 years (around 12/28/2020) for BP Recheck.      Future tests that were ordered for you today     Open Future Orders        Priority Expected Expires Ordered    MR Brain w/o Contrast Routine  12/28/2018 12/28/2017    Echocardiogram Complete Routine  12/28/2018 12/28/2017            Who to contact     If you have questions or need follow up information about today's clinic visit or your schedule please contact [facility] directly at [phone].  Normal or non-critical lab and imaging results will be communicated to you by MyChart, letter or phone within 4 business days after the clinic has received the results. If you do not hear from us within 7 days,  please contact the clinic through [employer] or phone. If you have a critical or abnormal lab result, we will notify you by phone as soon as possible.  Submit refill requests through [employer] or call your pharmacy and they will forward the refill request to us. Please allow 3 business days for your refill to be completed.          Additional Information About Your Visit        [employer] Information     [employer] gives you secure access to your electronic health record. If you see a primary care provider, you can also send messages to your care team and make appointments. If you have questions, please call your primary care clinic.  If you do not have a primary care provider, please call [phone] and they will assist you.        Care EveryWhere ID     This is your Care EveryWhere ID. This could be used by other organizations to access your [place] medical records  [id number]         Blood Pressure from Last 3 Encounters:   11/28/17 161/82   11/25/17 173/88    Weight from Last 3 Encounters:   11/28/17 183 lb 10.3 oz (83.3 kg)   11/25/17 180 lb (81.6 kg)              We Performed the Following     Anti Treponema     MEMORY CLINIC REFERRAL     ONC/HEME ADULT REFERRAL     TSH with free T4 reflex     Vitamin B12          Today's Medication Changes          These changes are accurate as of: 12/28/17  4:30 PM.  If you have any questions, ask your nurse or doctor.               Start taking these medicines.        Dose/Directions    amLODIPine 2.5 MG tablet   Commonly known as:  NORVASC   Used for:  Uncontrolled hypertension   Started by:  Yakov Marroquin MD        Dose:  2.5 mg   Take 1 tablet (2.5 mg) by mouth daily   Quantity:  30 tablet   Refills:  1       order for DME   Used for:  Uncontrolled hypertension   Started by:  Yakov Marroquin MD        Equipment being ordered: blood pressure monitor   Quantity:  1 Device   Refills:  0         These medicines have changed or have updated prescriptions.         Dose/Directions    * atenolol 25 MG tablet   Commonly known as:  TENORMIN   This may have changed:  Another medication with the same name was added. Make sure you understand how and when to take each.   Used for:  Benign essential hypertension        Dose:  50 mg   Take 2 tablets (50 mg) by mouth 2 times daily   Quantity:  60 tablet   Refills:  0       * atenolol 50 MG tablet   Commonly known as:  TENORMIN   This may have changed:  You were already taking a medication with the same name, and this prescription was added. Make sure you understand how and when to take each.   Used for:  Uncontrolled hypertension   Changed by:  Yakov Marroquin MD        Dose:  50 mg   Take 1 tablet (50 mg) by mouth 2 times daily   Quantity:  180 tablet   Refills:  3       * Notice:  This list has 2 medication(s) that are the same as other medications prescribed for you. Read the directions carefully, and ask your doctor or other care provider to review them with you.         Where to get your medicines      These medications were sent to [address] [address]     Phone:  [phone]     amLODIPine 2.5 MG tablet    atenolol 50 MG tablet         Some of these will need a paper prescription and others can be bought over the counter.  Ask your nurse if you have questions.     Bring a paper prescription for each of these medications     order for DME                Primary Care Provider Office Phone # Fax #    Anjelica Berger [phone]       [address] [address]        Equal Access to Services     BRANDO MEJIA : Hadii joseph ashero Soyolie, waaxda luqadaha, qaybta kaalmada adeegyada, phyllis whipple. So [facility] [phone].    ATENCIÓN: Si habla español, tiene a [place] disposición servicios gratuitos de asistencia lingüística. Llame al [phone].    We comply with applicable federal  civil rights laws and [place] laws. We do not discriminate on the basis of race, color, national origin, age, disability, sex, sexual orientation, or gender identity.            Thank you!     Thank you for choosing [facility]  for your care. Our goal is always to provide you with excellent care. Hearing back from our patients is one way we can continue to improve our services. Please take a few minutes to complete the written survey that you may receive in the mail after your visit with us. Thank you!             Your Updated Medication List - Protect others around you: Learn how to safely use, store and throw away your medicines at www.disposemymeds.org.          This list is accurate as of: 12/28/17  4:30 PM.  Always use your most recent med list.                   Brand Name Dispense Instructions for use Diagnosis    amLODIPine 2.5 MG tablet    NORVASC    30 tablet    Take 1 tablet (2.5 mg) by mouth daily    Uncontrolled hypertension       * atenolol 25 MG tablet    TENORMIN    60 tablet    Take 2 tablets (50 mg) by mouth 2 times daily    Benign essential hypertension       * atenolol 50 MG tablet    TENORMIN    180 tablet    Take 1 tablet (50 mg) by mouth 2 times daily    Uncontrolled hypertension       CENTRUM SILVER per tablet      Take 1 tablet by mouth daily        LORAZEPAM PO      Take 1 mg by mouth 2 times daily as needed        order for DME     1 Device    Equipment being ordered: blood pressure monitor    Uncontrolled hypertension       RISPERIDONE PO      Take 1 mg by mouth At Bedtime        * XARELTO PO      Take 20 mg by mouth daily        * Rivaroxaban 15 & 20 MG Tbpk     51 each    Take 15-20 mg by mouth daily    Acute deep vein thrombosis (DVT) of right lower extremity, unspecified vein (H)       SERTRALINE HCL PO      Take 50 mg by mouth daily        ZOLPIDEM TARTRATE PO      Take 6.25 mg by mouth At Bedtime        * Notice:  This list has 4 medication(s) that are the same  as other medications prescribed for you. Read the directions carefully, and ask your doctor or other care provider to review them with you.

## 2017-12-28 NOTE — PROGRESS NOTES
SUBJECTIVE:   Daisy Mehta is a 82 year old female who presents to clinic today for the following health issues:     establish care.    1: hosp follow up- was seen for htn and leg swelling. Found to have a DVT. savanaon evieo now for about a month. Doing well but has had one nosebleed and one episode of blood in the urine.     Unclear cause of the DVT. She has not had recent cancer screening. No hx of dvt.     2: htn-she had been on atenolol and this was increased to 50mg twice daily. No side effects.     3: dementia-slowly worsening per report. She moved from [place] recently to be closer to family. Less clear history beyond the past couple of months.     med hx, family hx, and soc hx reviewed and updated     No other cv, neuro symptoms. She does have occ anxiety treated with ativan when she has trouble sleeping. Also listed is riseridone. Unclear as to this. No other heme, msk, breast, gi, gu symptoms     OBJECTIVE: /88  Pulse 64  Temp 98.6  F (37  C) (Tympanic)  Resp 14  Wt 180 lb (81.6 kg)  SpO2 98%  BMI 29.95 kg/m2 Exam:  GENERAL APPEARANCE: healthy, alert and no distress  EYES: Eyes grossly normal to inspection  HENT: ear canals and TM's normal and nose and mouth without ulcers or lesions  NECK: no adenopathy, no asymmetry, masses, or scars and thyroid normal to palpation  RESP: lungs clear to auscultation - no rales, rhonchi or wheezes  CV: grade 2/6 systolic murmur heard best over the base.   ABDOMEN:  soft, nontender, no HSM or masses and bowel sounds normal  MS: extremities normal- no gross deformities noted, no evidence of inflammation in joints, FROM in all extremities.  SKIN: no suspicious lesions or rashes  NEURO: symmetric. Memory is obviously poor. Not oriented to date but is to day. Answers questions appropriately.  PSYCH: mentation appears normal and affect normal/bright       ICD-10-CM    1. Uncontrolled hypertension I10 atenolol (TENORMIN) 50 MG tablet     amLODIPine (NORVASC)  2.5 MG tablet     order for DME     Urine Microscopic   2. Acute deep vein thrombosis (DVT) of popliteal vein of right lower extremity (H) I82.431 ONC/HEME ADULT REFERRAL   3. Dementia without behavioral disturbance, unspecified dementia type F03.90 MR Brain w/o Contrast     MEMORY CLINIC REFERRAL     TSH with free T4 reflex     Vitamin B12     Anti Treponema     Vitamin B1 whole blood     Methylmalonic acid   4. Heart murmur R01.1 Echocardiogram Complete   5. Hematuria, unspecified type R31.9 *UA reflex to Microscopic and Culture ([facility] (except [place] and [place])    continue the xarelto if the bleeding recurs we will reassess. Unclear why she had the DVT. She could consider a mammogram. Recheck the urine. To the neurology or memory clinic for the dementia. labs pending. The heart murmur is reportedly old and an echo has been done not long ago. Time of visit > 40 minutes greater than half in counseling and coordination of care. Seen with the help of her son and daughter in law.

## 2017-12-28 NOTE — PATIENT INSTRUCTIONS
To schedule your heart echo and brain MRI and Mammogram, call [phone] for [place] scheduling.

## 2017-12-28 NOTE — NURSING NOTE
"Chief Complaint   Patient presents with     Hospital F/U     ER F/U       Initial /88  Pulse 64  Temp 98.6  F (37  C) (Tympanic)  Resp 14  Wt 180 lb (81.6 kg)  SpO2 98%  BMI 29.95 kg/m2 Estimated body mass index is 29.95 kg/(m^2) as calculated from the following:    Height as of 11/28/17: 5' 5\" (1.651 m).    Weight as of this encounter: 180 lb (81.6 kg).  Medication Reconciliation: complete     Sergei Mcbride MA      "

## 2017-12-29 LAB
TSH SERPL DL<=0.005 MIU/L-ACNC: 3.74 MU/L (ref 0.4–4)
VIT B12 SERPL-MCNC: 1063 PG/ML (ref 193–986)

## 2017-12-30 LAB — T PALLIDUM IGG+IGM SER QL: NEGATIVE

## 2018-01-01 PROBLEM — F03.90 DEMENTIA WITHOUT BEHAVIORAL DISTURBANCE, UNSPECIFIED DEMENTIA TYPE: Status: ACTIVE | Noted: 2018-01-01

## 2018-01-01 PROBLEM — I10 UNCONTROLLED HYPERTENSION: Status: ACTIVE | Noted: 2018-01-01

## 2018-01-01 LAB
METHYLMALONATE SERPL-SCNC: <0.1 UMOL/L (ref 0–0.4)
VIT B1 BLD-MCNC: 140 NMOL/L (ref 70–180)

## 2018-02-13 ENCOUNTER — RADIANT APPOINTMENT (OUTPATIENT)
Dept: GENERAL RADIOLOGY | Facility: CLINIC | Age: 83
End: 2018-02-13
Attending: FAMILY MEDICINE
Payer: MEDICARE

## 2018-02-13 ENCOUNTER — OFFICE VISIT (OUTPATIENT)
Dept: FAMILY MEDICINE | Facility: CLINIC | Age: 83
End: 2018-02-13
Payer: MEDICARE

## 2018-02-13 VITALS
RESPIRATION RATE: 16 BRPM | DIASTOLIC BLOOD PRESSURE: 81 MMHG | SYSTOLIC BLOOD PRESSURE: 147 MMHG | TEMPERATURE: 98 F | WEIGHT: 178.6 LBS | HEIGHT: 65 IN | HEART RATE: 60 BPM | BODY MASS INDEX: 29.76 KG/M2

## 2018-02-13 DIAGNOSIS — M25.561 CHRONIC PAIN OF RIGHT KNEE: ICD-10-CM

## 2018-02-13 DIAGNOSIS — G89.29 CHRONIC PAIN OF RIGHT KNEE: Primary | ICD-10-CM

## 2018-02-13 DIAGNOSIS — I82.431 ACUTE DEEP VEIN THROMBOSIS (DVT) OF POPLITEAL VEIN OF RIGHT LOWER EXTREMITY (H): ICD-10-CM

## 2018-02-13 DIAGNOSIS — M25.561 CHRONIC PAIN OF RIGHT KNEE: Primary | ICD-10-CM

## 2018-02-13 DIAGNOSIS — G89.29 CHRONIC PAIN OF RIGHT KNEE: ICD-10-CM

## 2018-02-13 DIAGNOSIS — I10 UNCONTROLLED HYPERTENSION: ICD-10-CM

## 2018-02-13 DIAGNOSIS — F03.90 DEMENTIA WITHOUT BEHAVIORAL DISTURBANCE, UNSPECIFIED DEMENTIA TYPE: ICD-10-CM

## 2018-02-13 PROCEDURE — 73562 X-RAY EXAM OF KNEE 3: CPT | Mod: RT

## 2018-02-13 PROCEDURE — 99214 OFFICE O/P EST MOD 30 MIN: CPT | Performed by: FAMILY MEDICINE

## 2018-02-13 RX ORDER — AMLODIPINE BESYLATE 5 MG/1
5 TABLET ORAL DAILY
Qty: 30 TABLET | Refills: 3 | Status: SHIPPED | OUTPATIENT
Start: 2018-02-13 | End: 2018-04-17

## 2018-02-13 NOTE — PROGRESS NOTES
"  SUBJECTIVE:   Daisy Mehta is a 82 year old female who presents to clinic today for the following health issues:    Hypertension Follow-up      Outpatient blood pressures are not being checked.    Low Salt Diet: low salt      Amount of exercise or physical activity: None    Problems taking medications regularly: No    Medication side effects: none    Diet: regular (no restrictions) and low salt    KNEE Pain-present for years. Only the right knee. She does not think it has been xrayed for year. Worse when she is on it. Does not affect sleep. Tried nothing    Back pain. Nonradiating. bilateral lower back present for year. xrays in the past showed djd per family reports but we don't have access to records. No numbness/tingling. Does not affect sleep. Tried nothing.     Dementia-on risperdal for unclear reasons. Things are stable with her living with her son. She will be staying with another son for a couple weeks coming up. Some trouble with continence at nighttime. Has not scheduled with the memory clinic.     Insomnia-problems for some time. On zolpidem. Unclear how long she has been on this medication.     dvt- RLE. On xarelto. tolerating this well now. Recommended HEME follow up not scheduled yet. She is coming up on 3 months of therapy. Unprovoked.     med hx, family hx, and soc hx reviewed and updated     OBJECTIVE: /81  Pulse 60  Temp 98  F (36.7  C) (Oral)  Resp 16  Ht 5' 5\" (1.651 m)  Wt 178 lb 9.6 oz (81 kg)  BMI 29.72 kg/m2 no apparent distress   Exam:  GENERAL APPEARANCE: healthy, alert and no distress  EYES: Eyes grossly normal to inspection  HENT: ear canals and TM's normal and nose and mouth without ulcers or lesions  RESP: lungs clear to auscultation - no rales, rhonchi or wheezes  CV: regular rates and rhythm, normal S1 S2, no S3 or S4 and no murmur, click or rub -  MS: Focused knee examination: flexes to 90 degrees, no effusion, no laxity with Lachman's, varus or valgus stress, negative " Venkat's, positive medial joint line tenderness and positive lateral joint line tenderness.   SKIN: no suspicious lesions or rashes  NEURO: Normal strength and tone, sensory exam grossly normal. Her memory is poor. Unable to recall recent events accurately. Not clear on her past history as well. No sign behavioral disturbance or halluciations.    Knee xray shows djd changes.       ICD-10-CM    1. Chronic pain of right knee M25.561 XR Knee Right 3 Views    G89.29 diclofenac (VOLTAREN) 1 % GEL topical gel   2. Uncontrolled hypertension I10 amLODIPine (NORVASC) 5 MG tablet   3. Acute deep vein thrombosis (DVT) of popliteal vein of right lower extremity (H) I82.431    4. Dementia without behavioral disturbance, unspecified dementia type F03.90    rec to see heme to help with recommendations for treatment duration and any further workup recommended. Increase norvasc to 5mg daily for htn. Will have them cut back on risperdal and possibly stop given the unclear indication. Possibly this might also help with nighttime continence. Use of OTC  meds. Discussed for the knee djd. Consider injections if oral and topical meds not helpful.

## 2018-02-13 NOTE — PATIENT INSTRUCTIONS
Acetomenophen 500-1000 up to 3 times daily. Max 3000mg daily    Over the counter lidocaine patches also may help the back pain.     Cut back on the risperidone 0.5mg (this is 1/2 pill nightly.) if this is tolerated well we could even think about stopping this one altogether.

## 2018-02-13 NOTE — MR AVS SNAPSHOT
After Visit Summary   2/13/2018    Daisy Mehta    MRN: [id number]           Patient Information     Date Of Birth          1935        Visit Information        Provider Department      2/13/2018 2:00 PM Yakov Marroquin MD [facility]        Today's Diagnoses     Chronic pain of right knee    -  1    Uncontrolled hypertension        Acute deep vein thrombosis (DVT) of popliteal vein of right lower extremity (H)          Care Instructions    Acetomenophen 500-1000 up to 3 times daily. Max 3000mg daily    Over the counter lidocaine patches also may help the back pain.     Cut back on the risperidone 0.5mg (this is 1/2 pill nightly.) if this is tolerated well we could even think about stopping this one altogether.               Follow-ups after your visit        Follow-up notes from your care team     Return in about 3 months (around 5/13/2018) for Routine Visit.      Who to contact     If you have questions or need follow up information about today's clinic visit or your schedule please contact [facility] directly at [phone].  Normal or non-critical lab and imaging results will be communicated to you by Hallspothart, letter or phone within 4 business days after the clinic has received the results. If you do not hear from us within 7 days, please contact the clinic through [employer] or phone. If you have a critical or abnormal lab result, we will notify you by phone as soon as possible.  Submit refill requests through [employer] or call your pharmacy and they will forward the refill request to us. Please allow 3 business days for your refill to be completed.          Additional Information About Your Visit        Hallspothart Information     [employer] gives you secure access to your electronic health record. If you see a primary care provider, you can also send messages to your care team and make appointments. If you have questions, please call your primary care clinic.   "If you do not have a primary care provider, please call [phone] and they will assist you.        Care EveryWhere ID     This is your Care EveryWhere ID. This could be used by other organizations to access your [facility] medical records  [id number]        Your Vitals Were     Pulse Temperature Respirations Height BMI (Body Mass Index)       60 98  F (36.7  C) (Oral) 16 5' 5\" (1.651 m) 29.72 kg/m2        Blood Pressure from Last 3 Encounters:   02/13/18 159/88   12/28/17 151/88   11/28/17 161/82    Weight from Last 3 Encounters:   02/13/18 178 lb 9.6 oz (81 kg)   12/28/17 180 lb (81.6 kg)   11/28/17 183 lb 10.3 oz (83.3 kg)                 Today's Medication Changes          These changes are accurate as of 2/13/18  3:10 PM.  If you have any questions, ask your nurse or doctor.               Start taking these medicines.        Dose/Directions    diclofenac 1 % Gel topical gel   Commonly known as:  VOLTAREN   Used for:  Chronic pain of right knee   Started by:  Yakov Marroquin MD        Apply 4 grams to knees or 2 grams to hands four times daily using enclosed dosing card.   Quantity:  100 g   Refills:  1         These medicines have changed or have updated prescriptions.        Dose/Directions    * amLODIPine 2.5 MG tablet   Commonly known as:  NORVASC   This may have changed:  Another medication with the same name was added. Make sure you understand how and when to take each.   Used for:  Uncontrolled hypertension   Changed by:  Yakov Marroquin MD        Dose:  2.5 mg   Take 1 tablet (2.5 mg) by mouth daily   Quantity:  30 tablet   Refills:  1       * amLODIPine 5 MG tablet   Commonly known as:  NORVASC   This may have changed:  You were already taking a medication with the same name, and this prescription was added. Make sure you understand how and when to take each.   Used for:  Uncontrolled hypertension   Changed by:  Yakov Marroquin MD        Dose:  5 mg   Take 1 tablet (5 mg) by mouth daily "   Quantity:  30 tablet   Refills:  3       atenolol 50 MG tablet   Commonly known as:  TENORMIN   This may have changed:  Another medication with the same name was removed. Continue taking this medication, and follow the directions you see here.   Used for:  Uncontrolled hypertension   Changed by:  Yakov Marroquin MD        Dose:  50 mg   Take 1 tablet (50 mg) by mouth 2 times daily   Quantity:  180 tablet   Refills:  3       * Notice:  This list has 2 medication(s) that are the same as other medications prescribed for you. Read the directions carefully, and ask your doctor or other care provider to review them with you.         Where to get your medicines      These medications were sent to [place] [address] [place] [facility] [address] [address]     Phone:  [phone]     amLODIPine 5 MG tablet    diclofenac 1 % Gel topical gel                Primary Care Provider Office Phone # Fax #    Anjelica Berger [phone]       [address] [address]        Equal Access to Services     TRISTEN MEJIA AH: Hadii aad ku hadasho Soomaali, waaxda luqadaha, qaybta kaalmada adeegyada, waxay idiin haypeppern dianne phillips . So [facility] [phone].    ATENCIÓN: Si habla español, tiene a [place] disposición servicios gratuitos de asistencia lingüística. [facility] [phone].    We comply with applicable federal civil rights laws and [place] laws. We do not discriminate on the basis of race, color, national origin, age, disability, sex, sexual orientation, or gender identity.            Thank you!     Thank you for choosing [facility]  for your care. Our goal is always to provide you with excellent care. Hearing back from our patients is one way we can continue to improve our services. Please take a few minutes to complete the written survey that you may receive in the mail after your visit with us. Thank you!              Your Updated Medication List - Protect others around you: Learn how to safely use, store and throw away your medicines at www.disposemymeds.org.          This list is accurate as of 2/13/18  3:10 PM.  Always use your most recent med list.                   Brand Name Dispense Instructions for use Diagnosis    * amLODIPine 2.5 MG tablet    NORVASC    30 tablet    Take 1 tablet (2.5 mg) by mouth daily    Uncontrolled hypertension       * amLODIPine 5 MG tablet    NORVASC    30 tablet    Take 1 tablet (5 mg) by mouth daily    Uncontrolled hypertension       atenolol 50 MG tablet    TENORMIN    180 tablet    Take 1 tablet (50 mg) by mouth 2 times daily    Uncontrolled hypertension       CENTRUM SILVER per tablet      Take 1 tablet by mouth daily        diclofenac 1 % Gel topical gel    VOLTAREN    100 g    Apply 4 grams to knees or 2 grams to hands four times daily using enclosed dosing card.    Chronic pain of right knee       LORAZEPAM PO      Take 1 mg by mouth 2 times daily as needed        order for DME     1 Device    Equipment being ordered: blood pressure monitor    Uncontrolled hypertension       RISPERIDONE PO      Take 1 mg by mouth At Bedtime        SERTRALINE HCL PO      Take 50 mg by mouth daily        XARELTO PO      Take 20 mg by mouth daily        ZOLPIDEM TARTRATE PO      Take 6.25 mg by mouth At Bedtime        * Notice:  This list has 2 medication(s) that are the same as other medications prescribed for you. Read the directions carefully, and ask your doctor or other care provider to review them with you.

## 2018-02-20 DIAGNOSIS — I82.431 ACUTE DEEP VEIN THROMBOSIS (DVT) OF POPLITEAL VEIN OF RIGHT LOWER EXTREMITY (H): Primary | ICD-10-CM

## 2018-02-20 NOTE — LETTER
February 23, 2018      Daisy Mehta  [address] [place] [address]        Dear Daisy,     Please make an appointment with the Hematologist that is to direct your care regarding the   XARELTO medication.  A referral was made and given to you on 12/29/17.  The number to call is the  Hematology at [phone].     Lets us know if you have any questions or concerns.          Sincerely,        Yakov Marroquin MD/Carmella Gama RN

## 2018-02-21 RX ORDER — RIVAROXABAN 20 MG/1
TABLET, FILM COATED ORAL
Qty: 30 TABLET | Refills: 1 | Status: SHIPPED | OUTPATIENT
Start: 2018-02-21 | End: 2018-04-24

## 2018-02-21 NOTE — TELEPHONE ENCOUNTER
"Routing refill request to provider for review/approval because:  Medication is reported/historical    Dr. Marroquin-Please sign if agree. Note from pharmacy: \"This was originally prescribed by an emergency room doctor, patient is hoping Dr. Marroquin can continue this therapy by allowing further refills.\"    Thank you!  SOLEDAD NguyenN, RN              Requested Prescriptions   Pending Prescriptions Disp Refills     XARELTO 20 MG TABS tablet [Pharmacy Med Name: XARELTO 20 MG TABLET] 30 tablet 1     Sig: TAKE 1 TABLET BY MOUTH DAILY WITH FOOD    Platelet Inhibitors Passed    2/20/2018  6:04 PM       Passed - Normal ALT on file in past 12 months    Recent Labs   Lab Test  11/28/17   0723   ALT  21            Passed - Normal HGB on file in past 12 months    Recent Labs   Lab Test  11/28/17   0723   HGB  14.3              Passed - Normal AST on file in past 12 months    Recent Labs   Lab Test  11/28/17   0723   AST  17            Passed - Normal Platelets on file in past 12 months    Recent Labs   Lab Test  11/28/17   0723   PLT  260              Passed - Recent or future visit with authorizing provider's specialty    Patient had office visit in the last year or has a visit in the next 30 days with authorizing provider.  See \"Patient Info\" tab in inbasket, or \"Choose Columns\" in Meds & Orders section of the refill encounter.            Passed - Patient is age 18 or older       Passed - No active pregnancy on record       Passed - Normal serum creatinine on file in past 12 months    Recent Labs   Lab Test  11/28/17   0723   CR  0.79            Passed - No positive pregnancy test in past 12 months          "

## 2018-02-21 NOTE — TELEPHONE ENCOUNTER
Filled for 2 months.     They were to see hematology to help with recommendations as to further workup or length of anticoagultion therapy. I don't see that scheduled yet.     Yakov Marroquin

## 2018-02-22 NOTE — TELEPHONE ENCOUNTER
LMOM with details and the hematology number.  It looks like this number called is the sons number.  Carmella Gama RN

## 2018-02-23 DIAGNOSIS — I10 UNCONTROLLED HYPERTENSION: ICD-10-CM

## 2018-02-23 RX ORDER — AMLODIPINE BESYLATE 2.5 MG/1
TABLET ORAL
Qty: 0.1 TABLET | Refills: 0 | OUTPATIENT
Start: 2018-02-23

## 2018-02-23 NOTE — TELEPHONE ENCOUNTER
A letter was sent to patient to make hematology appt. Sent referral and number to call.  Carmella Gama RN

## 2018-02-23 NOTE — TELEPHONE ENCOUNTER
"Requested Prescriptions   Pending Prescriptions Disp Refills     amLODIPine (NORVASC) 2.5 MG tablet [Pharmacy Med Name: AMLODIPINE BESYLATE 2.5 MG TAB]  Last Written Prescription Date:  2/13/2018  Last Fill Quantity: 30,  # refills: 0   Last office visit: 2/13/2018 with prescribing provider:  Yakov Marroquin   Future Office Visit:     30 tablet 1     Sig: TAKE 1 TABLET BY MOUTH DAILY    Calcium Channel Blockers Protocol  Failed    2/23/2018  1:28 AM       Failed - Blood pressure under 140/90 in past 12 months    BP Readings from Last 3 Encounters:   02/13/18 147/81   12/28/17 151/88   11/28/17 161/82                Passed - Recent or future visit with authorizing provider    Patient had office visit in the last year or has a visit in the next 30 days with authorizing provider.  See \"Patient Info\" tab in inbasket, or \"Choose Columns\" in Meds & Orders section of the refill encounter.            Passed - Patient is age 18 or older       Passed - No active pregnancy on record       Passed - Normal serum creatinine on file in past 12 months    Recent Labs   Lab Test  11/28/17   0723   CR  0.79            Passed - No positive pregnancy test in past 12 months          "

## 2018-02-26 ENCOUNTER — NURSE TRIAGE (OUTPATIENT)
Dept: NURSING | Facility: CLINIC | Age: 83
End: 2018-02-26

## 2018-02-26 NOTE — TELEPHONE ENCOUNTER
"The patient's son, Simon Mehta, called to report that the patient is out of her Ambien.  Patient is currently visiting a relative, her other son, in the [place].. Simon states he did send a My Chart message and spoke with someone in the clinic, earlier today. Simon is advised the refill pending approval by Yakov Marroquin MD. Triage was not done. Simon will check with Dr Marroquin's office in morning, if not approved tonight.  Reason for Disposition    Caller has URGENT medication question about med that PCP prescribed and triager unable to answer question    Additional Information    Negative: Drug overdose and nurse unable to answer question    Negative: Caller requesting information not related to medicine    Negative: Caller requesting a prescription for Strep throat and has a positive culture result    Negative: Rash while taking a medication or within 3 days of stopping it    Negative: Immunization reaction suspected    Negative: [1] Asthma and [2] having symptoms of asthma (cough, wheezing, etc)    Negative: MORE THAN A DOUBLE DOSE of a prescription or over-the-counter (OTC) drug    Negative: [1] DOUBLE DOSE (an extra dose or lesser amount) of over-the-counter (OTC) drug AND [2] any symptoms (e.g., dizziness, nausea, pain, sleepiness)    Negative: [1] DOUBLE DOSE (an extra dose or lesser amount) of prescription drug AND [2] any symptoms (e.g., dizziness, nausea, pain, sleepiness)    Negative: Took another person's prescription drug    Negative: Pharmacy calling with prescription questions and triager unable to answer question    Negative: [1] Prescription not at pharmacy AND [2] was prescribed today by PCP    Negative: [1] Request for URGENT new prescription or refill of \"essential\" medication (i.e., likelihood of harm to patient if not taken) AND [2] triager unable to fill per unit policy    Negative: Diabetes drug error or overdose (e.g., insulin or extra dose)    Negative: [1] DOUBLE DOSE (an extra dose or " lesser amount) of prescription drug AND [2] NO symptoms (Exception: a double dose of antibiotics)    Protocols used: MEDICATION QUESTION CALL-ADULT-

## 2018-10-26 DIAGNOSIS — I10 UNCONTROLLED HYPERTENSION: ICD-10-CM

## 2018-10-26 RX ORDER — AMLODIPINE BESYLATE 5 MG/1
TABLET ORAL
Qty: 30 TABLET | Refills: 0 | Status: SHIPPED | OUTPATIENT
Start: 2018-10-26

## 2018-10-26 NOTE — TELEPHONE ENCOUNTER
Per 2/13/2018 visit was to follow up in 3 months.      Reception: please call and schedule follow up visit.  thanks

## 2018-10-26 NOTE — TELEPHONE ENCOUNTER
"Requested Prescriptions   Pending Prescriptions Disp Refills     amLODIPine (NORVASC) 5 MG tablet [Pharmacy Med Name: AMLODIPINE BESYLATE 5 MG TAB]  Last Written Prescription Date:  4-18-18  Last Fill Quantity: 90 tab,  # refills: 1   Last office visit: 2/13/2018 with prescribing provider:  Yakov Marroquin    Future Office Visit:    90 tablet 1     Sig: TAKE 1 TABLET BY MOUTH EVERY DAY    Calcium Channel Blockers Protocol  Failed    10/26/2018  2:01 AM       Failed - Blood pressure under 140/90 in past 12 months    BP Readings from Last 3 Encounters:   02/13/18 147/81   12/28/17 151/88   11/28/17 161/82          Passed - Recent (12 mo) or future (30 days) visit within the authorizing provider's specialty    Patient had office visit in the last 12 months or has a visit in the next 30 days with authorizing provider or within the authorizing provider's specialty.  See \"Patient Info\" tab in inbasket, or \"Choose Columns\" in Meds & Orders section of the refill encounter.           Passed - Patient is age 18 or older       Passed - No active pregnancy on record       Passed - Normal serum creatinine on file in past 12 months    Recent Labs   Lab Test  11/28/17   0723   CR  0.79          Passed - No positive pregnancy test in past 12 months          "

## 2018-10-29 NOTE — TELEPHONE ENCOUNTER
Left a non-detailed message on VM to call back. Patient is due for a follow up visit with PCP.    Vanessa Sinha

## 2018-11-01 NOTE — TELEPHONE ENCOUNTER
Left non-detailed for patient to return phone call to clinic (2nd attempt). She is due for OV with provider and needs to be informed of refill.    Mikaela DUBON     [facility]

## 2018-11-05 NOTE — TELEPHONE ENCOUNTER
Left non-detailed for patient to return phone call to clinic (3rd attempt). Sent letter and MyChart message.           Mikaela DUBON     [facility]

## 2018-11-06 DIAGNOSIS — I10 UNCONTROLLED HYPERTENSION: ICD-10-CM

## 2018-11-07 NOTE — TELEPHONE ENCOUNTER
Routing refill request to provider for review/approval because:  Dose increase 2/13/18 - with no recheck or follow up at this time - patient not at goal - do you want office visit?      Thank you,  Xenia Jaeger RN

## 2018-11-07 NOTE — TELEPHONE ENCOUNTER
Below is a note from Alysha. Maybe we can forward the request to her current provider in [place].   ------    Dr. Marroquin -- JAVILAKSHMI I spoke to son Ricky. Daisy has moved to [place] and they are in the process of establishing care for her there, so she won't be following up with heme at the . Son appreciates that you filled Xarelto.     Alysha Bhat, SOLEDADN, RN  [facility]

## 2018-11-07 NOTE — TELEPHONE ENCOUNTER
"Requested Prescriptions   Pending Prescriptions Disp Refills     amLODIPine (NORVASC) 5 MG tablet [Pharmacy Med Name: AMLODIPINE BESYLATE 5 MG TAB]  Last Written Prescription Date:  10/26/2018  Last Fill Quantity: 30 tablet,  # refills: 0   Last Office Visit: 2/13/2018   Future Office Visit:      90 tablet 1     Sig: TAKE 1 TABLET BY MOUTH EVERY DAY    Calcium Channel Blockers Protocol  Failed    11/6/2018  6:51 PM       Failed - Blood pressure under 140/90 in past 12 months    BP Readings from Last 3 Encounters:   02/13/18 147/81   12/28/17 151/88   11/28/17 161/82          Passed - Recent (12 mo) or future (30 days) visit within the authorizing provider's specialty    Patient had office visit in the last 12 months or has a visit in the next 30 days with authorizing provider or within the authorizing provider's specialty.  See \"Patient Info\" tab in inbasket, or \"Choose Columns\" in Meds & Orders section of the refill encounter.           Passed - Patient is age 18 or older       Passed - No active pregnancy on record       Passed - Normal serum creatinine on file in past 12 months    Recent Labs   Lab Test  11/28/17   0723   CR  0.79          Passed - No positive pregnancy test in past 12 months          "

## 2018-11-09 NOTE — TELEPHONE ENCOUNTER
Routing refill request to provider for review/approval because:  Danya given x1 and patient did not follow up, please advise

## 2018-11-09 NOTE — TELEPHONE ENCOUNTER
See the note below. [place]. She should get this filled there by her new phsyician.     Yakov Marroquin

## 2018-11-12 RX ORDER — AMLODIPINE BESYLATE 5 MG/1
TABLET ORAL
Qty: 0.1 TABLET | Refills: 0 | OUTPATIENT
Start: 2018-11-12

## 2018-11-12 NOTE — TELEPHONE ENCOUNTER
She no longer lives in [place] she has moved to another state. She should get this filled there by her new phsyician.

## 2018-12-12 DIAGNOSIS — I10 UNCONTROLLED HYPERTENSION: ICD-10-CM

## 2018-12-12 NOTE — TELEPHONE ENCOUNTER
"Requested Prescriptions   Pending Prescriptions Disp Refills     amLODIPine (NORVASC) 5 MG tablet [Pharmacy Med Name: AMLODIPINE BESYLATE 5 MG TAB]    Last Written Prescription Date:  10/26/2018  Last Fill Quantity: 30 tablet    ,  # refills: 0   Last Office Visit: 2/13/2018   Future Office Visit:      30 tablet 0     Sig: TAKE 1 TABLET BY MOUTH EVERY DAY    Calcium Channel Blockers Protocol  Failed - 12/12/2018  2:42 AM       Failed - Blood pressure under 140/90 in past 12 months    BP Readings from Last 3 Encounters:   02/13/18 147/81   12/28/17 151/88   11/28/17 161/82          Failed - Normal serum creatinine on file in past 12 months    Recent Labs   Lab Test 11/28/17  0723   CR 0.79          Passed - Recent (12 mo) or future (30 days) visit within the authorizing provider's specialty    Patient had office visit in the last 12 months or has a visit in the next 30 days with authorizing provider or within the authorizing provider's specialty.  See \"Patient Info\" tab in inbasket, or \"Choose Columns\" in Meds & Orders section of the refill encounter.           Passed - Patient is age 18 or older       Passed - No active pregnancy on record       Passed - No positive pregnancy test in past 12 months          "

## 2018-12-13 RX ORDER — AMLODIPINE BESYLATE 5 MG/1
TABLET ORAL
Qty: 30 TABLET | Refills: 0 | OUTPATIENT
Start: 2018-12-13

## 2018-12-20 DIAGNOSIS — I10 UNCONTROLLED HYPERTENSION: ICD-10-CM

## 2018-12-22 NOTE — TELEPHONE ENCOUNTER
"Requested Prescriptions   Pending Prescriptions Disp Refills       amLODIPine (NORVASC) 5 MG tablet [Pharmacy Med Name: AMLODIPINE BESYLATE 5 MG TAB]  Last Written Prescription Date:  10/26/2018  Last Fill Quantity: 30 tabs,  # refills: 0   Last office visit: 2/13/2018 with prescribing provider:  Lopez   Future Office Visit:     30 tablet 0     Sig: TAKE 1 TABLET BY MOUTH EVERY DAY    Calcium Channel Blockers Protocol  Failed - 12/20/2018  1:30 PM       Failed - Blood pressure under 140/90 in past 12 months    BP Readings from Last 3 Encounters:   02/13/18 147/81   12/28/17 151/88   11/28/17 161/82                Failed - Normal serum creatinine on file in past 12 months    Recent Labs   Lab Test 11/28/17  0723   CR 0.79            Passed - Recent (12 mo) or future (30 days) visit within the authorizing provider's specialty    Patient had office visit in the last 12 months or has a visit in the next 30 days with authorizing provider or within the authorizing provider's specialty.  See \"Patient Info\" tab in inbasket, or \"Choose Columns\" in Meds & Orders section of the refill encounter.             Passed - Patient is age 18 or older       Passed - No active pregnancy on record       Passed - No positive pregnancy test in past 12 months          "

## 2018-12-23 RX ORDER — AMLODIPINE BESYLATE 5 MG/1
TABLET ORAL
Qty: 30 TABLET | Refills: 0 | OUTPATIENT
Start: 2018-12-23

## 2018-12-23 NOTE — TELEPHONE ENCOUNTER
See 11/6/18 refill encounter.    Patient moved to [place].    Med refused.  SOLEDAD NguyenN, RN

## 2019-02-09 DIAGNOSIS — I10 UNCONTROLLED HYPERTENSION: ICD-10-CM

## 2019-02-10 NOTE — TELEPHONE ENCOUNTER
"Requested Prescriptions   Pending Prescriptions Disp Refills     atenolol (TENORMIN) 50 MG tablet [Pharmacy Med Name: ATENOLOL 50 MG TABLET]    Last Written Prescription Date:  12/28/2017  Last Fill Quantity: 180 tablet    ,  # refills: 3   Last Office Visit: 2/13/2018   Future Office Visit:      180 tablet 2     Sig: TAKE 1 TABLET BY MOUTH TWICE DAILY    Beta-Blockers Protocol Failed - 2/9/2019 12:34 AM       Failed - Blood pressure under 140/90 in past 12 months    BP Readings from Last 3 Encounters:   02/13/18 147/81   12/28/17 151/88   11/28/17 161/82          Passed - Patient is age 6 or older       Passed - Recent (12 mo) or future (30 days) visit within the authorizing provider's specialty    Patient had office visit in the last 12 months or has a visit in the next 30 days with authorizing provider or within the authorizing provider's specialty.  See \"Patient Info\" tab in inbasket, or \"Choose Columns\" in Meds & Orders section of the refill encounter.           Passed - Medication is active on med list          "

## 2019-02-12 RX ORDER — ATENOLOL 50 MG/1
TABLET ORAL
Qty: 180 TABLET | Refills: 2 | OUTPATIENT
Start: 2019-02-12

## 2019-02-12 NOTE — TELEPHONE ENCOUNTER
Routing refill request to provider for review/approval because:  Blood pressure above threshold.  Winter Thornton RN

## 2019-02-12 NOTE — TELEPHONE ENCOUNTER
Med refused.    Team coordinators-Please contact patient to ask her to have pharmacy direct refill request to her new PCP in [place].    Thank you!  DANIELLE Martinez BSN, RN

## 2019-02-12 NOTE — TELEPHONE ENCOUNTER
I think she move to [place]. See 11/6/18 pedro. Her provider there shoud be taking over refils    Yakov Marroquin

## 2019-02-13 DIAGNOSIS — I10 UNCONTROLLED HYPERTENSION: ICD-10-CM

## 2019-02-13 NOTE — TELEPHONE ENCOUNTER
Ricky states that patient is no living with her other son in [place] & number on file was removed per his request. Ricky states that he is not primary care giver anymore and to cancel all requests since she does not live in [place].    Writer notified pharmacy that requests should go through her pharmacy in [place].    Vanessa Sinha

## 2019-02-14 RX ORDER — ATENOLOL 50 MG/1
TABLET ORAL
Qty: 0.1 TABLET | Refills: 0 | OUTPATIENT
Start: 2019-02-14

## 2019-02-14 NOTE — TELEPHONE ENCOUNTER
"Requested Prescriptions   Pending Prescriptions Disp Refills     atenolol (TENORMIN) 50 MG tablet [Pharmacy Med Name: ATENOLOL 50 MG TABLET]      Last Written Prescription Date:  12/28/2017  Last Fill Quantity: 180 tablet    ,  # refills: 3   Last Office Visit: 2/13/2018   Future Office Visit:      180 tablet 2     Sig: TAKE 1 TABLET BY MOUTH TWICE DAILY    Beta-Blockers Protocol Failed - 2/13/2019  9:35 AM       Failed - Blood pressure under 140/90 in past 12 months    BP Readings from Last 3 Encounters:   02/13/18 147/81   12/28/17 151/88   11/28/17 161/82          Failed - Recent (12 mo) or future (30 days) visit within the authorizing provider's specialty    Patient had office visit in the last 12 months or has a visit in the next 30 days with authorizing provider or within the authorizing provider's specialty.  See \"Patient Info\" tab in inbasket, or \"Choose Columns\" in Meds & Orders section of the refill encounter.           Passed - Patient is age 6 or older       Passed - Medication is active on med list          "